# Patient Record
Sex: FEMALE | Race: WHITE | Employment: PART TIME | URBAN - METROPOLITAN AREA
[De-identification: names, ages, dates, MRNs, and addresses within clinical notes are randomized per-mention and may not be internally consistent; named-entity substitution may affect disease eponyms.]

---

## 2020-02-06 ENCOUNTER — HOSPITAL ENCOUNTER (OUTPATIENT)
Dept: MRI IMAGING | Age: 20
Discharge: HOME | End: 2020-02-06
Attending: ORTHOPAEDIC SURGERY
Payer: COMMERCIAL

## 2020-02-06 ENCOUNTER — HOSPITAL ENCOUNTER (OUTPATIENT)
Dept: GENERAL RADIOLOGY | Age: 20
Discharge: HOME | End: 2020-02-06
Attending: ORTHOPAEDIC SURGERY
Payer: COMMERCIAL

## 2020-02-06 DIAGNOSIS — M25.552 LEFT HIP PAIN: ICD-10-CM

## 2020-02-06 PROCEDURE — 74011250636 HC RX REV CODE- 250/636: Performed by: ORTHOPAEDIC SURGERY

## 2020-02-06 PROCEDURE — 77002 NEEDLE LOCALIZATION BY XRAY: CPT

## 2020-02-06 PROCEDURE — A9577 INJ MULTIHANCE: HCPCS | Performed by: ORTHOPAEDIC SURGERY

## 2020-02-06 PROCEDURE — 74011636320 HC RX REV CODE- 636/320: Performed by: ORTHOPAEDIC SURGERY

## 2020-02-06 PROCEDURE — 73722 MRI JOINT OF LWR EXTR W/DYE: CPT

## 2020-02-06 RX ORDER — LIDOCAINE HYDROCHLORIDE 10 MG/ML
1-10 INJECTION, SOLUTION EPIDURAL; INFILTRATION; INTRACAUDAL; PERINEURAL
Status: COMPLETED | OUTPATIENT
Start: 2020-02-06 | End: 2020-02-06

## 2020-02-06 RX ORDER — SODIUM CHLORIDE 9 MG/ML
1-20 INJECTION INTRAMUSCULAR; INTRAVENOUS; SUBCUTANEOUS
Status: COMPLETED | OUTPATIENT
Start: 2020-02-06 | End: 2020-02-06

## 2020-02-06 RX ORDER — LIDOCAINE HYDROCHLORIDE 10 MG/ML
INJECTION, SOLUTION EPIDURAL; INFILTRATION; INTRACAUDAL; PERINEURAL
Status: DISCONTINUED
Start: 2020-02-06 | End: 2020-02-06 | Stop reason: WASHOUT

## 2020-02-06 RX ADMIN — SODIUM CHLORIDE 20 ML: 9 INJECTION INTRAMUSCULAR; INTRAVENOUS; SUBCUTANEOUS at 13:29

## 2020-02-06 RX ADMIN — IOPAMIDOL 1 ML: 612 INJECTION, SOLUTION INTRAVENOUS at 13:31

## 2020-02-06 RX ADMIN — LIDOCAINE HYDROCHLORIDE 4 ML: 10 INJECTION, SOLUTION EPIDURAL; INFILTRATION; INTRACAUDAL; PERINEURAL at 13:29

## 2020-02-06 RX ADMIN — GADOBENATE DIMEGLUMINE 0.1 ML: 529 INJECTION, SOLUTION INTRAVENOUS at 13:26

## 2020-02-06 NOTE — H&P
The patient is an appropriate candidate to undergo hip arthrogram.    Patient assessed immediately prior to induction. Anesthesia plan as follows: Local/No Anesthesia. History and Physical update:  H&P was reviewed and the patient was examined. No changes have occurred in the patient's condition since the H&P was completed.     Jelena Garcia MD  Vascular & Interventional Radiology  [facility]  2/6/2020

## 2020-02-06 NOTE — PROCEDURES
Vascular & Interventional Radiology Brief Procedure Note    Interventional Radiologist: Lisa Rios MD    Pre-operative Diagnosis:  Hip pain    Post-operative Diagnosis: Same as pre-op dx    Procedure(s) Performed:  same    Anesthesia:  Local and Moderate Sedation    Findings:  Uncomplicated L hip arthrogram, MRI to follow.      Complications: None    Estimated Blood Loss:  minimal    Tubes and Drains: None    Specimens: None    Condition: Good       Lisa Rios MD  [facility]  Vascular & Interventional Radiology  2/6/2020

## 2020-05-20 ENCOUNTER — HOSPITAL ENCOUNTER (OUTPATIENT)
Dept: PHYSICAL THERAPY | Age: 20
Discharge: HOME | End: 2020-05-20
Payer: COMMERCIAL

## 2020-05-20 PROCEDURE — 97110 THERAPEUTIC EXERCISES: CPT | Performed by: PHYSICAL THERAPIST

## 2020-05-20 PROCEDURE — 97140 MANUAL THERAPY 1/> REGIONS: CPT | Performed by: PHYSICAL THERAPIST

## 2020-05-20 PROCEDURE — 97162 PT EVAL MOD COMPLEX 30 MIN: CPT | Performed by: PHYSICAL THERAPIST

## 2020-05-20 PROCEDURE — 97530 THERAPEUTIC ACTIVITIES: CPT | Performed by: PHYSICAL THERAPIST

## 2020-05-20 NOTE — PROGRESS NOTES
[address] AT [address], [address]  Phone: [phone]   [phone]  PLAN OF CARE / [address] [place] [address] PHYSICAL THERAPY SERVICES  Patient Name: Deepak Rivera : 2000   Medical   Diagnosis: Other sprain of left hip, initial encounter [S73.192A] Treatment Diagnosis: L hip pain   Onset Date: 2020     Referral Source: Marlene Abbott MD Start of [facility]): 2020   Prior Hospitalization: See medical history Provider #: [id number]   Prior Level of Function: IND   Comorbidities: Arthritis, alcohol use, R knee surgeries   Medications: Verified on Patient Summary List   The Plan of Care and following information is based on the information from the initial evaluation.   ===========================================================================================  Assessment / key information:  Pt is a 22year old female with L acetabular hip labral tear sp arthroscopic repair on 2020. Currently she rate her L hip pain a 5/10 with medication. Functional limitations are consistent with recent surgical interventions. She was a catcher for softball team in college and over the years her pain just increased. Negative for red flags. FOTO: 27/100. Upon evaluation, pt ambulates with brace on the L hip donned underneath clothing. She ambulated with B axillary crutches demonstrating decreased weight-bearing on the L with foot flat weight acceptance. Incision covered with bandages not to be removed for 3 days per patient. PROM of the L hip flexion: ~30 deg, abduction: ~25 deg, ER to neutral. Pt was able to demo a good quad contraction with quad set.  Pt would benefit from a course of skilled PT to address above deficits to return to PLOF symptom free.   ===========================================================================================  History MEDIUM  Complexity : 1-2 comorbidities / personal factors will impact the outcome/ POC ; Examination HIGH Complexity : 4+ Standardized tests and measures addressing body structure, function, activity limitation and / or participation in recreation ; Presentation MEDIUM Complexity : Evolving with changing characteristics ; Decision Making MEDIUM Complexity : FOTO score of 26-74; Complexity MEDIUM  Problem List: pain affecting function, decrease ROM, decrease strength, edema affecting function, impaired gait/ balance, decrease ADL/ functional abilitiies, decrease activity tolerance, decrease flexibility/ joint mobility and decrease transfer abilities   Treatment Plan may include any combination of the following: Therapeutic exercise, Therapeutic activities, Neuromuscular re-education, Physical agent/modality, Gait/balance training, Manual therapy, Patient education and Functional mobility training  Patient / Family readiness to learn indicated by: asking questions and trying to perform skills  Persons(s) to be included in education: family support person (FSP);list father  Barriers to Learning/Limitations: None  Measures taken:    Patient Goal (s): Return to ADLs pain free   Patient self reported health status: good  Rehabilitation Potential: good   Short Term Goals: To be accomplished in  2  weeks:  1. Pt will be IND and complaint with HEP for self-management of symptoms. 2. Pt will improve  PROM of the L hip to WNLs to prevent secondary complications.  Long Term Goals: To be accomplished in  4  weeks:  1. Pt will be able to ambulate community distances without AD demonstrating normalized gait pattern. 2. Pt will be able to perform full supine bridge without pain to progress core strengthening. 3. Pt will improve AROM of the L hip to full to maximize exercise tolerance. 4. Pt will improve FOTO score to at least 60/100 as a functional indicator improved mobility.    Frequency / Duration:   Patient to be seen  2  times per week for 4  weeks:  Patient / Caregiver education and instruction: exercises  G-Codes (GP): NA  Therapist Signature: Aleah Greenfield DPT Date: 8/22/0308   Certification Period: NA Time: 5:07 PM   ===========================================================================================  I certify that the above Physical Therapy Services are being furnished while the patient is under my care. I agree with the treatment plan and certify that this therapy is necessary. Physician Signature:        Date:       Time:     Please sign and return to [facility] or you may fax the signed copy to [phone]. Thank you.

## 2020-05-20 NOTE — PROGRESS NOTES
PT DAILY TREATMENT NOTE     Patient Name: Katlin Maurice  Date:2020  : 2000  [x]  Patient  Verified  Payor: Fayetta Hamman / Plan: Roxanne Talamantes / Product Type: HMO /    In time:407  Out time:500  Total Treatment Time (min): 53  Visit #: 1 of 8    Treatment Area: Other sprain of left hip, initial encounter [D44.930N]    SUBJECTIVE  Pain Level (0-10 scale): 5/10  Any medication changes, allergies to medications, adverse drug reactions, diagnosis change, or new procedure performed?: [x] No    [] Yes (see summary sheet for update)  Subjective functional status/changes:   [] No changes reported  Pt is a 22year old female with L acetabular hip labral tear sp arthroscopic repair on 2020. Currently she rate her L hip pain a 5/10 with medication. Functional limitations are consistent with recent surgical interventions. She was a catcher for softball team in college and over the years her pain just increased. Negative for red flags. FOTO: .       OBJECTIVE    Modality rationale: PD   Min Type Additional Details    [] Estim:  []Unatt       []IFC  []Premod                        []Other:  []w/ice   []w/heat  Position:  Location:    [] Estim: []Att    []TENS instruct  []NMES                    []Other:  []w/US   []w/ice   []w/heat  Position:  Location:    []  Traction: [] Cervical       []Lumbar                       [] Prone          []Supine                       []Intermittent   []Continuous Lbs:  [] before manual  [] after manual    []  Ultrasound: []Continuous   [] Pulsed                           []1MHz   []3MHz W/cm2:  Location:    []  Iontophoresis with dexamethasone         Location: [] Take home patch   [] In clinic    []  Ice     []  heat  []  Ice massage  []  Laser   []  Anodyne Position:  Location:    []  Laser with stim  []  Other:  Position:  Location:    []  Vasopneumatic Device Pressure:       [] lo [] med [] hi   Temperature: [] lo [] med [] hi   [] Skin assessment post-treatment:  []intact []redness- no adverse reaction    []redness - adverse reaction:     18 min [x]Eval                  []Re-Eval       10 min Therapeutic Exercise:  [] See flow sheet : established/educated PT HEP    Rationale: increase ROM and increase strength to improve the patients ability to return to PLOF    10 min Manual Therapy: Technique:      [] S/DTM []IASTM [x]PROM [] Passive Stretching   [] Graston:  [] GT 1  [] GT 2 [] GT 3 [] GT 4 [] GT 4 [] GT 5  [] GT 6  [] Sweep [] Fan [] [place]  [] Brush   []  Swivel []J- Stroke [] Scoop []IFraming     []Manual TPR  [] TDN (see objective; actual needle insertion time not billed)  []Jt manipulation:Gr I [] II []  III [] IV[] V[]  Treatment/Area: L hip per MD protocol    Rationale:      decrease pain, increase ROM, increase tissue extensibility and decrease trigger points to improve patient's ability to prevent secondary complications        With   [] TE   [x] TA 15 min   [] neuro   [] other: Patient Education: [x] Review HEP    [] Progressed/Changed HEP based on:   [] positioning   [] body mechanics   [] transfers   [] heat/ice application    [] Graston Education: Explained the effects and benefits of Graston Technique therapy including potential for post treatment soreness and bruising. [x] Other: how to properly sam/doff brace, fitted brace properly to patient     Other Objective/Functional Measures:   Upon evaluation, pt ambulates with brace on the L hip donned underneath clothing. She ambulated with B axillary crutches demonstrating decreased weight-bearing on the L with foot flat weight acceptance. Incision covered with bandages not to be removed for 3 days per patient. PROM of the L hip flexion: ~30 deg, abduction: ~25 deg, ER to neutral. Pt was able to demo a good quad contraction with quad set.      Pain Level (0-10 scale) post treatment: 5/10    ASSESSMENT/Changes in Function:   [x]  See Plan of Care  []  See progress note/recertification  []  See Discharge Summary         Progress towards goals / Updated goals:  PER POC    PLAN  []  Upgrade activities as tolerated     [x]  Continue plan of care  []  Update interventions per flow sheet       []  Discharge due to:_  [x]  Other 2x/week for 4 weeks     Justification for Eval Code Complexity:  Patient History : age, chronicty  Examination see exam   Clinical Presentation: evolving  Clinical Decision Making : FOTO : 32 /100        Scot Chilel, KATHYT 5/20/2020  4:16 PM    Future Appointments   Date Time Provider Michelle Luna   5/27/2020  4:00 PM Stephanie Parish, PT [facility] [address]   5/28/2020  1:00 PM Kamini New, PTA [facility] [address]   6/1/2020  3:00 PM Janet Yoni, PTA [facility] [address]   6/4/2020  4:00 PM Kamini New, PTA [facility] [address]   6/8/2020  4:00 PM Janet Yoni, PTA [facility] [address]   6/11/2020  4:00 PM Kamini New, PTA [facility] [address]   6/15/2020  4:00 PM Janet Yoni, PTA [facility] [address]   6/18/2020  4:00 PM Kamini New, PTA [facility] [address]   6/22/2020  4:00 PM Fouzia Maki, DPT [facility] [address]   6/25/2020  4:00 PM Jeanne Hamilton, PTA [facility] [address]

## 2020-05-27 ENCOUNTER — HOSPITAL ENCOUNTER (OUTPATIENT)
Dept: PHYSICAL THERAPY | Age: 20
Discharge: HOME | End: 2020-05-27
Payer: COMMERCIAL

## 2020-05-27 PROCEDURE — 97016 VASOPNEUMATIC DEVICE THERAPY: CPT

## 2020-05-27 PROCEDURE — 97110 THERAPEUTIC EXERCISES: CPT

## 2020-05-27 PROCEDURE — 97140 MANUAL THERAPY 1/> REGIONS: CPT

## 2020-05-27 NOTE — PROGRESS NOTES
PHYSICAL THERAPY - DAILY TREATMENT NOTE    Patient Name: Miriam Cabrales        Date: 2020  : 2000   yes Patient  Verified  Visit #:   2   of   8  Insurance: Payor: Shun Kay / Plan: Renetta Tan / Product Type: HMO /      In time: 4:05 Out time: 5:15   Total Treatment Time: 70     Medicare/[facility] Time Tracking (below)   Total Timed Codes (min):  60 1:1 Treatment Time:  52     TREATMENT AREA =  Other sprain of left hip, initial encounter [S73.012S]    SUBJECTIVE  Pain Level (on 0 to 10 scale):  0  / 10   Medication Changes/New allergies or changes in medical history, any new surgeries or procedures?    no  If yes, update Summary List   Subjective Functional Status/Changes:  []  No changes reported     Pt reports compliance w/ HEP.  Reports difficulty sleeping, has attempted to lay on her side but feels like \"gravity is working against me\"          OBJECTIVE  Modalities Rationale:     decrease inflammation and decrease pain to improve patient's ability to transfer   min [] Estim, type/location:                                      []  att     []  unatt     []  w/US     []  w/ice    []  w/heat    min []  Mechanical Traction: type/lbs                   []  pro   []  sup   []  int   []  cont    []  before manual    []  after manual    min []  Ultrasound, settings/location:      min []  Iontophoresis w/ dexamethasone, location:                                               []  take home patch       []  in clinic    min []  Ice     []  Heat    location/position:    10 min [x]  Vasopneumatic Device, press/temp: LP/LT to the L hip in spuine    min []  Other:    [x] Skin assessment post-treatment (if applicable):    [x]  intact    []  redness- no adverse reaction     []redness - adverse reaction:        50 min Therapeutic Exercise:  [x]  See flow sheet   Rationale:      increase ROM and increase strength to improve the patients ability to complete ADLs     10 min Manual Therapy: STM to the L TFL, iliopsoas, glute med, prox RF; supine hip flexion and abd PROM, prone knee flexion PROM, prone hip IR PROM, prone hip ER PROM to 15 deg   Rationale:      decrease pain, increase ROM, increase tissue extensibility and decrease trigger points to improve patient's ability to complete ADLs, transfers    Billed With/As:   [x] TE   [] TA   [] Neuro   [] Self Care Patient Education: [x] Review HEP    [] Progressed/Changed HEP based on:   [] positioning   [] body mechanics   [] transfers   [] heat/ice application    [] other:      Other Objective/Functional Measures:    Ambulating w/ 40% WB on the L  Added prone laying x 5' to reduce psoas tension  Able to ride upright bike with hip flexion < 90 deg to improve hip ROM  req A to don thigh strap on brace     Post Treatment Pain Level (on 0 to 10) scale:   0  / 10     ASSESSMENT  Assessment/Changes in Function:     Added prone laying 5' 3x/day to HEP for psoas flexibility. Reviewed post op restrictions and WB status. []  See Progress Note/Recertification   Patient will continue to benefit from skilled PT services to modify and progress therapeutic interventions, address functional mobility deficits, address ROM deficits, address strength deficits, analyze and address soft tissue restrictions, analyze and cue movement patterns, analyze and modify body mechanics/ergonomics, assess and modify postural abnormalities, address imbalance/dizziness and instruct in home and community integration to attain remaining goals. Progress toward goals / Updated goals:    Rehabilitation Potential: good  · Short Term Goals: To be accomplished in  2  weeks:  1. Pt will be IND and complaint with HEP for self-management of symptoms. 5/27/20: goal met, pt reports progress  2. Pt will improve  PROM of the L hip to WNLs to prevent secondary complications. · Long Term Goals: To be accomplished in  4  weeks:  1.  Pt will be able to ambulate community distances without AD demonstrating normalized gait pattern. 2. Pt will be able to perform full supine bridge without pain to progress core strengthening. 3. Pt will improve AROM of the L hip to full to maximize exercise tolerance. 4. Pt will improve FOTO score to at least 60/100 as a functional indicator improved mobility.         PLAN  []  Upgrade activities as tolerated yes Continue plan of care   []  Discharge due to :    []  Other:      Therapist: Jim Rousseau PT    Date: 5/27/2020 Time: 5:20 PM     Future Appointments   Date Time Provider Michelle Luna   5/28/2020  1:00 PM Heather Becker, PTA [facility]   6/1/2020  3:00 PM Andrea Claudio, PTA [facility]   6/4/2020  4:00 PM Heather Becker, PTA [facility]   6/8/2020  4:00 PM Harinder Estrada, PT [facility]   6/11/2020  4:00 PM Heather Becker, PTA [facility]   6/15/2020  4:00 PM Parvin Love, PT [facility]   6/18/2020  4:00 PM Heather Becker, PTA [facility]   6/22/2020  4:00 PM Harinder Estrada, PT [facility]   6/25/2020  4:00 PM Heather Becker, PTA [facility]

## 2020-05-28 ENCOUNTER — HOSPITAL ENCOUNTER (OUTPATIENT)
Dept: PHYSICAL THERAPY | Age: 20
Discharge: HOME | End: 2020-05-28
Payer: COMMERCIAL

## 2020-05-28 PROCEDURE — 97140 MANUAL THERAPY 1/> REGIONS: CPT

## 2020-05-28 PROCEDURE — 97110 THERAPEUTIC EXERCISES: CPT

## 2020-05-28 PROCEDURE — 97016 VASOPNEUMATIC DEVICE THERAPY: CPT

## 2020-05-28 NOTE — PROGRESS NOTES
PHYSICAL THERAPY - DAILY TREATMENT NOTE    Patient Name: Ninfa Reardon        Date: 2020  : 2000   yes Patient  Verified  Visit #:   3  of   8  Insurance: Payor: Viktoria Aguilar / Plan: Angel Daniel / Product Type: HMO /      In time: 1:00 Out time: 2:17   Total Treatment Time: 77     Medicare/[employer] Time Tracking (below)   Total Timed Codes (min):  57 1:1 Treatment Time:  52     TREATMENT AREA =  Other sprain of left hip, initial encounter [S73.192A]    SUBJECTIVE  Pain Level (on 0 to 10 scale):  0  / 10   Medication Changes/New allergies or changes in medical history, any new surgeries or procedures?    no  If yes, update Summary List   Subjective Functional Status/Changes:  []  No changes reported     I am doing pretty good, I have been doing my home exercise program - it is my favorite part of my day          OBJECTIVE  Modalities Rationale:     decrease inflammation and decrease pain to improve patient's ability to transfer   min [] Estim, type/location:                                      []  att     []  unatt     []  w/US     []  w/ice    []  w/heat    min []  Mechanical Traction: type/lbs                   []  pro   []  sup   []  int   []  cont    []  before manual    []  after manual    min []  Ultrasound, settings/location:      min []  Iontophoresis w/ dexamethasone, location:                                               []  take home patch       []  in clinic    min []  Ice     []  Heat    location/position:    15+5 set up min [x]  Vasopneumatic Device, press/temp: LP/LT to the L hip in spuine    min []  Other:    [x] Skin assessment post-treatment (if applicable):    [x]  intact    []  redness- no adverse reaction     []redness - adverse reaction:        47/42 min Therapeutic Exercise:  [x]  See flow sheet - 5 min NC for warm up  Increase from prone laying to BLAIR and prone press up to stretch the hip flexor   Rationale:      increase ROM and increase strength to improve the patients ability to complete ADLs     10 min Manual Therapy:  PROM in prone for Hip IR and ER, gentle quad stretch   PROM in supine for hip abduction   Rationale:      decrease pain, increase ROM, increase tissue extensibility and decrease trigger points to improve patient's ability to complete ADLs, transfers    Billed With/As:   [x] TE   [] TA   [] Neuro   [] Self Care Patient Education: [x] Review HEP    [] Progressed/Changed HEP based on:   [] positioning   [] body mechanics   [] transfers   [] heat/ice application    [] other:      Other Objective/Functional Measures:  Patient was able to advance to prone on elbow and gentle prone press up to gently stretch the hip flexor  Patient tolerated all exercises well within allowed protocol   Continue with above POC to achieve all goals stated below       Post Treatment Pain Level (on 0 to 10) scale:   0  / 10     ASSESSMENT  Assessment/Changes in Function:   Continue with above POC to achieve all goals stated below   []  See Progress Note/Recertification   Patient will continue to benefit from skilled PT services to modify and progress therapeutic interventions, address functional mobility deficits, address ROM deficits, address strength deficits, analyze and address soft tissue restrictions, analyze and cue movement patterns, analyze and modify body mechanics/ergonomics, assess and modify postural abnormalities, address imbalance/dizziness and instruct in home and community integration to attain remaining goals. Progress toward goals / Updated goals:    Rehabilitation Potential: good  · Short Term Goals: To be accomplished in  2  weeks:  1. Pt will be IND and complaint with HEP for self-management of symptoms. 5/27/20: goal met, pt reports progress  2. Pt will improve  PROM of the L hip to WNLs to prevent secondary complications. · Long Term Goals: To be accomplished in  4  weeks:  1.  Pt will be able to ambulate community distances without AD demonstrating normalized gait pattern. 2. Pt will be able to perform full supine bridge without pain to progress core strengthening. 3. Pt will improve AROM of the L hip to full to maximize exercise tolerance. 4. Pt will improve FOTO score to at least 60/100 as a functional indicator improved mobility.         PLAN  []  Upgrade activities as tolerated yes Continue plan of care   []  Discharge due to :    []  Other:      Therapist: Shaun Mendez PTA    Date: 5/28/2020 Time: 5:20 PM     Future Appointments   Date Time Provider Michelle Luna   6/1/2020  3:00 PM Carey Rios, PTA [facility]   6/4/2020  4:00 PM Jj Hernandez, PTA [facility]   6/8/2020  4:00 PM Agustín Loev, PT [facility]   6/11/2020  4:00 PM Jj Hernandez, PTA [facility]   6/15/2020  4:00 PM Agustín Love, PT [facility]   6/18/2020  4:00 PM Jj Hernandez, PTA [facility]   6/22/2020  4:00 PM Sharona Lu, PT [facility]   6/25/2020  4:00 PM Jj Hernandez, PTA [facility]

## 2020-06-01 ENCOUNTER — HOSPITAL ENCOUNTER (OUTPATIENT)
Dept: PHYSICAL THERAPY | Age: 20
Discharge: HOME | End: 2020-06-01
Payer: COMMERCIAL

## 2020-06-01 PROCEDURE — 97016 VASOPNEUMATIC DEVICE THERAPY: CPT

## 2020-06-01 PROCEDURE — 97140 MANUAL THERAPY 1/> REGIONS: CPT

## 2020-06-01 PROCEDURE — 97110 THERAPEUTIC EXERCISES: CPT

## 2020-06-01 NOTE — PROGRESS NOTES
PHYSICAL THERAPY - DAILY TREATMENT NOTE    Patient Name: Radha Prasad        Date: 2020  : 2000   yes Patient  Verified  Visit #:   4   of   8  Insurance: Payor: Bobbi Deras / Plan: Keven Ratliff / Product Type: HMO /      In time: 3:00 Out time: 4:13   Total Treatment Time: 73     Medicare/[facility] Time Tracking (below)   Total Timed Codes (min):  58 1:1 Treatment Time:  43     TREATMENT AREA =  Other sprain of left hip, initial encounter [S73.192A]    SUBJECTIVE  Pain Level (on 0 to 10 scale):  0  / 10   Medication Changes/New allergies or changes in medical history, any new surgeries or procedures?    no  If yes, update Summary List   Subjective Functional Status/Changes:  []  No changes reported     \"I barely use my crutches at home, but I don't think I put full weight through my leg.  My gait is still wonky\"          OBJECTIVE  Modalities Rationale:     decrease edema, decrease inflammation and decrease pain to improve patient's ability to ambulate   min [] Estim, type/location:                                      []  att     []  unatt     []  w/US     []  w/ice    []  w/heat    min []  Mechanical Traction: type/lbs                   []  pro   []  sup   []  int   []  cont    []  before manual    []  after manual    min []  Ultrasound, settings/location:      min []  Iontophoresis w/ dexamethasone, location:                                               []  take home patch       []  in clinic    min []  Ice     []  Heat    location/position:    15 min [x]  Vasopneumatic Device, press/temp: LP/LT to the L hip in supine    min []  Other:    [x] Skin assessment post-treatment (if applicable):    [x]  intact    []  redness- no adverse reaction     []redness  adverse reaction:        45 min Therapeutic Exercise:  [x]  See flow sheet   Rationale:      increase ROM and increase strength to improve the patients ability to ambulate, transfer     10 min Manual Therapy: Prone hip IR/ER, gentle PQS, supine hip abd, flex PROM, supine 90/90 HS str   Rationale:      decrease pain, increase ROM, increase tissue extensibility and decrease trigger points to improve patient's ability to ambulate    Billed With/As:   [x] TE   [] TA   [] Neuro   [] Self Care Patient Education: [x] Review HEP    [] Progressed/Changed HEP based on:   [] positioning   [] body mechanics   [] transfers   [] heat/ice application    [] other:      Other Objective/Functional Measures:    amb with B axillary crutch w/ min support through crutches; significant R weight shift but improving stride length  Added SL hip abd/add isotonics and prone hip ext to improve hip strength/stability  Added prone planks for core stability     Post Treatment Pain Level (on 0 to 10) scale:   0  / 10     ASSESSMENT  Assessment/Changes in Function:     MD f/u June 4; plan to progress standing WB ex and gait training next week pending MD schwartz      []  See Progress Note/Recertification   Patient will continue to benefit from skilled PT services to modify and progress therapeutic interventions, address functional mobility deficits, address ROM deficits, address strength deficits, analyze and address soft tissue restrictions, analyze and cue movement patterns, analyze and modify body mechanics/ergonomics, address imbalance/dizziness and instruct in home and community integration to attain remaining goals. Progress toward goals / Updated goals: · Short Term Goals: To be accomplished in  2  weeks:  1. Pt will be IND and complaint with HEP for self-management of symptoms. 5/27/20: goal met, pt reports progress  2. Pt will improve  PROM of the L hip to WNLs to prevent secondary complications. · Long Term Goals: To be accomplished in  4  weeks:  1. Pt will be able to ambulate community distances without AD demonstrating normalized gait pattern. 6/2/20: goal in progress, demo improving symmetry of gait/step length w/ min use of B axillary crutch; continued R weight shift  2.  Pt will be able to perform full supine bridge without pain to progress core strengthening. 3. Pt will improve AROM of the L hip to full to maximize exercise tolerance.   4. Pt will improve FOTO score to at least 60/100 as a functional indicator improved mobility.        PLAN  []  Upgrade activities as tolerated yes Continue plan of care   []  Discharge due to :    []  Other:      Therapist: Steffen Castro PT    Date: 6/1/2020 Time: 5:01 PM     Future Appointments   Date Time Provider Michelle Luna   6/3/2020  9:00 AM Luis E Centeno, PTA [facility]   6/8/2020  4:00 PM Yaya Treviño, PT [facility]   6/11/2020  4:00 PM Garth Gao, PTA [facility]   6/15/2020  4:00 PM Dallas Love, PT [facility]   6/18/2020  4:00 PM Garth Gao, PTA [facility]   6/22/2020  3:00 PM Carlos Dodge, DPT [facility]   6/25/2020  4:00 PM Marce Hill, PTA [facility]

## 2020-06-03 ENCOUNTER — HOSPITAL ENCOUNTER (OUTPATIENT)
Dept: PHYSICAL THERAPY | Age: 20
Discharge: HOME | End: 2020-06-03
Payer: COMMERCIAL

## 2020-06-03 PROCEDURE — 97110 THERAPEUTIC EXERCISES: CPT

## 2020-06-03 PROCEDURE — 97140 MANUAL THERAPY 1/> REGIONS: CPT

## 2020-06-03 PROCEDURE — 97016 VASOPNEUMATIC DEVICE THERAPY: CPT

## 2020-06-03 NOTE — PROGRESS NOTES
PHYSICAL THERAPY - DAILY TREATMENT NOTE    Patient Name: Radha Prasad        Date: 6/3/2020  : 2000   yes Patient  Verified  Visit #:   5   of   8  Insurance: Payor: Bobbi Deras / Plan: Keven Ratliff / Product Type: HMO /      In time: 9:00 Out time: 10:20   Total Treatment Time: 80     Medicare/[facility] Time Tracking (below)   Total Timed Codes (min):  60 1:1 Treatment Time:  50     TREATMENT AREA =  Other sprain of left hip, initial encounter [S73.192A]    SUBJECTIVE  Pain Level (on 0 to 10 scale):  0  / 10   Medication Changes/New allergies or changes in medical history, any new surgeries or procedures?    no  If yes, update Summary List   Subjective Functional Status/Changes:  []  No changes reported     I am ready to get rid of these crutches, I see the doctor tomorrow so hopefully I can get rid of the brace and the crutches           OBJECTIVE  Modalities Rationale:     decrease edema, decrease inflammation and decrease pain to improve patient's ability to ambulate   min [] Estim, type/location:                                      []  att     []  unatt     []  w/US     []  w/ice    []  w/heat    min []  Mechanical Traction: type/lbs                   []  pro   []  sup   []  int   []  cont    []  before manual    []  after manual    min []  Ultrasound, settings/location:      min []  Iontophoresis w/ dexamethasone, location:                                               []  take home patch       []  in clinic    min []  Ice     []  Heat    location/position:    15+5 set up min [x]  Vasopneumatic Device, press/temp: LP/LT to the L hip in supine    min []  Other:    [x] Skin assessment post-treatment (if applicable):    [x]  intact    []  redness- no adverse reaction     []redness  adverse reaction:        50/40 min Therapeutic Exercise:  [x]  See flow sheet - 10 min NC for warm up           Added side lying mod planks for 20 sec each side 2 reps each    Rationale:      increase ROM and increase strength to improve the patients ability to ambulate, transfer     10 min Manual Therapy: Prone hip IR/ER, gentle PQS, supine hip abd, flex PROM, supine 90/90 HS str   Rationale:      decrease pain, increase ROM, increase tissue extensibility and decrease trigger points to improve patient's ability to ambulate    Billed With/As:   [x] TE   [] TA   [] Neuro   [] Self Care Patient Education: [x] Review HEP    [] Progressed/Changed HEP based on:   [] positioning   [] body mechanics   [] transfers   [] heat/ice application    [] other:      Other Objective/Functional Measures:  Per Protocol - patient is 3 weeks post-op was able to  Advance within protocol   Added standing hip hiking in parallel bars, side stepping and backwards stepping with one hand on counter top, modified side planks to both sides   Decreased to one crutch        Post Treatment Pain Level (on 0 to 10) scale:   0  / 10     ASSESSMENT  Assessment/Changes in Function:          []  See Progress Note/Recertification   Patient will continue to benefit from skilled PT services to modify and progress therapeutic interventions, address functional mobility deficits, address ROM deficits, address strength deficits, analyze and address soft tissue restrictions, analyze and cue movement patterns, analyze and modify body mechanics/ergonomics, address imbalance/dizziness and instruct in home and community integration to attain remaining goals. Progress toward goals / Updated goals: · Short Term Goals: To be accomplished in  2  weeks:  1. Pt will be IND and complaint with HEP for self-management of symptoms. 5/27/20: goal met, pt reports progress  2. Pt will improve  PROM of the L hip to WNLs to prevent secondary complications. · Long Term Goals: To be accomplished in  4  weeks:  1. Pt will be able to ambulate community distances without AD demonstrating normalized gait pattern.  6/2/20: goal in progress, demo improving symmetry of gait/step length w/ min use of B axillary crutch; continued R weight shift  2. Pt will be able to perform full supine bridge without pain to progress core strengthening. 3. Pt will improve AROM of the L hip to full to maximize exercise tolerance.   4. Pt will improve FOTO score to at least 60/100 as a functional indicator improved mobility.        PLAN  []  Upgrade activities as tolerated yes Continue plan of care   []  Discharge due to :    []  Other:      Therapist: Beverly Grullon PTA    Date: 6/3/2020 Time: 5:01 PM     Future Appointments   Date Time Provider Michelle Luna   6/8/2020  4:00 PM Renzo De La Rosa, PT [facility]   6/11/2020  4:00 PM Artie Oviedo PTA [facility]   6/15/2020  4:00 PM Cuong Love, PT [facility]   6/18/2020  4:00 PM Artie Oviedo PTA [facility]   6/22/2020  3:00 PM KATHY AquinoT [facility]   6/25/2020  4:00 PM Brittney Gay, TERRY [facility]

## 2020-06-04 ENCOUNTER — APPOINTMENT (OUTPATIENT)
Dept: PHYSICAL THERAPY | Age: 20
End: 2020-06-04
Payer: COMMERCIAL

## 2020-06-08 ENCOUNTER — HOSPITAL ENCOUNTER (OUTPATIENT)
Dept: PHYSICAL THERAPY | Age: 20
Discharge: HOME | End: 2020-06-08
Payer: COMMERCIAL

## 2020-06-08 PROCEDURE — 97110 THERAPEUTIC EXERCISES: CPT

## 2020-06-08 PROCEDURE — 97016 VASOPNEUMATIC DEVICE THERAPY: CPT

## 2020-06-08 PROCEDURE — 97140 MANUAL THERAPY 1/> REGIONS: CPT

## 2020-06-08 NOTE — PROGRESS NOTES
PHYSICAL THERAPY - DAILY TREATMENT NOTE    Patient Name: Maria A Whitley        Date: 2020  : 2000   yes Patient  Verified  Visit #:   6   of   8  Insurance: Payor: Davion Found / Plan: Susan Cooler / Product Type: HMO /      In time: 4:00 Out time: 5:08   Total Treatment Time: 68     Medicare/[facility] Time Tracking (below)   Total Timed Codes (min):  53 1:1 Treatment Time:  53     TREATMENT AREA =  Other sprain of left hip, initial encounter [S73.042A]    SUBJECTIVE  Pain Level (on 0 to 10 scale):  0  / 10   Medication Changes/New allergies or changes in medical history, any new surgeries or procedures?    no  If yes, update Summary List   Subjective Functional Status/Changes:  []  No changes reported     Pt reports MD was pleased w/ her progress, cleared to amb w/o AD, d/c brace, and drive. Reports she drove 5 hours to [place] this past weekend and had no pain or stiffness in her hip. OBJECTIVE  Modalities Rationale:     decrease inflammation and decrease pain to improve patient's ability to ambulate   min [] Estim, type/location:                                      []  att     []  unatt     []  w/US     []  w/ice    []  w/heat    min []  Mechanical Traction: type/lbs                   []  pro   []  sup   []  int   []  cont    []  before manual    []  after manual    min []  Ultrasound, settings/location:      min []  Iontophoresis w/ dexamethasone, location:                                               []  take home patch       []  in clinic    min []  Ice     []  Heat    location/position:    15 min [x]  Vasopneumatic Device, press/temp:  To the L hip, MP/LT in supine    min []  Other:    [x] Skin assessment post-treatment (if applicable):    [x]  intact    []  redness- no adverse reaction     []redness  adverse reaction:        45 min Therapeutic Exercise:  [x]  See flow sheet   Rationale:      increase ROM, increase strength, improve coordination, improve balance and increase proprioception to improve the patients ability to ambulate, transfer     8 min Manual Therapy: Prone hip IR/ER, gentle PQS, supine hip abd, flex PROM, supine 90/90 HS str   Rationale:      decrease pain, increase ROM, increase tissue extensibility and decrease trigger points to improve patient's ability to complete ADLs      Billed With/As:   [x] TE   [] TA   [] Neuro   [] Self Care Patient Education: [x] Review HEP    [] Progressed/Changed HEP based on:   [] positioning   [] body mechanics   [] transfers   [] heat/ice application    [] other:      Other Objective/Functional Measures:    Presents ambulating w/o AD, mildly reduced L hip extension otherwise near symmetrical gait  Progressed WB ex per flow to improve stabilization and strength to the L hip  Added SLS on airex to improve LE proprioception  Visible fatigue noted to isometric hip flexion, no pain reported to L hip flexor       Post Treatment Pain Level (on 0 to 10) scale:   0  / 10     ASSESSMENT  Assessment/Changes in Function:     Pt progressing excellent per post op protocol at 3 weeks post-op tomorrow. Will continue to progress as tolerated within protocol guidelines to improve function      []  See Progress Note/Recertification   Patient will continue to benefit from skilled PT services to modify and progress therapeutic interventions, address functional mobility deficits, address ROM deficits, address strength deficits, analyze and address soft tissue restrictions, analyze and cue movement patterns, analyze and modify body mechanics/ergonomics, assess and modify postural abnormalities, address imbalance/dizziness and instruct in home and community integration to attain remaining goals. Progress toward goals / Updated goals: · Short Term Goals: To be accomplished in  2  weeks:  1. Pt will be IND and complaint with HEP for self-management of symptoms. 5/27/20: goal met, pt reports progress  2.  Pt will improve  PROM of the L hip to WNLs to prevent secondary complications. · Long Term Goals: To be accomplished in  4  weeks:  1. Pt will be able to ambulate community distances without AD demonstrating normalized gait pattern. 6/2/20: goal in progress, demo improving symmetry of gait/step length w/ min use of B axillary crutch; continued R weight shift  2. Pt will be able to perform full supine bridge without pain to progress core strengthening. 6/8/20: goal met, supine bridge to 100%. Progress towards SL at [place]  3. Pt will improve AROM of the L hip to full to maximize exercise tolerance. 4. Pt will improve FOTO score to at least 60/100 as a functional indicator improved mobility.        PLAN  []  Upgrade activities as tolerated yes Continue plan of care   []  Discharge due to :    []  Other:      Therapist: Erskine Sandifer, PT    Date: 6/8/2020 Time: 4:18 PM     Future Appointments   Date Time Provider Michelle Luna   6/11/2020  4:00 PM Jennifer Davis PTA [facility]   6/15/2020  4:00 PM David Suárez PT [facility]   6/18/2020  4:00 PM Jennifer Davis PTA [facility]   6/22/2020  3:00 PM KATHY DietrichT [facility]   6/25/2020  4:00 PM Chai Phillips, PTA [facility]

## 2020-06-11 ENCOUNTER — HOSPITAL ENCOUNTER (OUTPATIENT)
Dept: PHYSICAL THERAPY | Age: 20
Discharge: HOME | End: 2020-06-11
Payer: COMMERCIAL

## 2020-06-11 PROCEDURE — 97530 THERAPEUTIC ACTIVITIES: CPT

## 2020-06-11 PROCEDURE — 97110 THERAPEUTIC EXERCISES: CPT

## 2020-06-11 NOTE — PROGRESS NOTES
PHYSICAL THERAPY - DAILY TREATMENT NOTE    Patient Name: Chetan Garner        Date: 2020  : 2000   yes Patient  Verified  Visit #:   7  of   8  Insurance: Payor: Petra  / Plan: [employer] / Product Type: HMO /      In time: 4:05 Out time: 4:58   Total Treatment Time:      Medicare/[employer] Time Tracking (below)   Total Timed Codes (min):  53 1:1 Treatment Time:  48     TREATMENT AREA =  Other sprain of left hip, initial encounter [S73.518S]    SUBJECTIVE  Pain Level (on 0 to 10 scale):  0  / 10   Medication Changes/New allergies or changes in medical history, any new surgeries or procedures?    no  If yes, update Summary List   Subjective Functional Status/Changes:  []  No changes reported     I am doing great no brace, no crutches and no pain        OBJECTIVE  Modalities Rationale:     decrease inflammation and decrease pain to improve patient's ability to ambulate   min [] Estim, type/location:                                      []  att     []  unatt     []  w/US     []  w/ice    []  w/heat    min []  Mechanical Traction: type/lbs                   []  pro   []  sup   []  int   []  cont    []  before manual    []  after manual    min []  Ultrasound, settings/location:      min []  Iontophoresis w/ dexamethasone, location:                                               []  take home patch       []  in clinic    min []  Ice     []  Heat    location/position:     min []  Vasopneumatic Device, press/temp:  To the L hip, MP/LT in supine    min []  Other:    [x] Skin assessment post-treatment (if applicable):    [x]  intact    []  redness- no adverse reaction     []redness  adverse reaction:        38/33 min Therapeutic Exercise:  [x]  See flow sheet - 5 min NC for warm up   Added 2 way clams with t-band  Standing hip adductor stretch and hamstring and hip flexor stretch  Added prone resisted hip IR and ER  Increase planks to 30 sec    Rationale:      increase ROM, increase strength, improve coordination, improve balance and increase proprioception to improve the patients ability to ambulate, transfer       15 min Therapeutic Activity:  [x]  See flow sheet : added SLS on foam with ball toss into trampoline   Added hip hiking off 6\" step with active hip abduction  t-band circuit with green t-band and side stepping with green t-band:  Single leg stance on fitter with star taps      Rationale: increase ROM, increase strength, improve coordination, improve balance and increase proprioception  to improve the patients ability to ambulate and return to normal ADLs        Billed With/As:   [x] TE   [] TA   [] Neuro   [] Self Care Patient Education: [x] Review HEP    [] Progressed/Changed HEP based on:   [] positioning   [] body mechanics   [] transfers   [] heat/ice application    [] other:      Other Objective/Functional Measures: see above for update exercises and activities to increase challenge of program for  Patient to allow patient to return to normal ADL and ambulation  Instructed patient in self stretching and gave update HEP for home use      Post Treatment Pain Level (on 0 to 10) scale:   0  / 10     ASSESSMENT  Assessment/Changes in Function:   Patient tolerated all exercises and activities well with no increase in pain or discomfort was challenged with new routine - ambulates with no gait deviation noted only slight muscle weakness and decrease stability with SLS on fitter with star taps to quads      []  See Progress Note/Recertification   Patient will continue to benefit from skilled PT services to modify and progress therapeutic interventions, address functional mobility deficits, address ROM deficits, address strength deficits, analyze and address soft tissue restrictions, analyze and cue movement patterns, analyze and modify body mechanics/ergonomics, assess and modify postural abnormalities, address imbalance/dizziness and instruct in home and community integration to attain remaining goals.   Progress toward goals / Updated goals: · Short Term Goals: To be accomplished in  2  weeks:  1. Pt will be IND and complaint with HEP for self-management of symptoms. 5/27/20: goal met, pt reports progress  2. Pt will improve  PROM of the L hip to WNLs to prevent secondary complications. · Long Term Goals: To be accomplished in  4  weeks:  1. Pt will be able to ambulate community distances without AD demonstrating normalized gait pattern. 6/2/20: goal in progress, demo improving symmetry of gait/step length w/ min use of B axillary crutch; continued R weight shift  2. Pt will be able to perform full supine bridge without pain to progress core strengthening. 6/8/20: goal met, supine bridge to 100%. Progress towards SL at [place]  3. Pt will improve AROM of the L hip to full to maximize exercise tolerance. 4. Pt will improve FOTO score to at least 60/100 as a functional indicator improved mobility.        PLAN  []  Upgrade activities as tolerated yes Continue plan of care   []  Discharge due to :    []  Other:      Therapist: Bree Sosa PTA    Date: 6/11/2020 Time: 4:18 PM     Future Appointments   Date Time Provider Michelle Luna   6/15/2020  4:00 PM Tip Love, PT [facility]   6/18/2020  4:00 PM Lauren Kaye, PTA [facility]   6/22/2020  3:00 PM KATHY CadeT [facility]   6/25/2020  4:00 PM Nikolai Dickinson, PTA [facility]

## 2020-06-15 ENCOUNTER — APPOINTMENT (OUTPATIENT)
Dept: PHYSICAL THERAPY | Age: 20
End: 2020-06-15
Payer: COMMERCIAL

## 2020-06-18 ENCOUNTER — HOSPITAL ENCOUNTER (OUTPATIENT)
Dept: PHYSICAL THERAPY | Age: 20
Discharge: HOME | End: 2020-06-18
Payer: COMMERCIAL

## 2020-06-18 PROCEDURE — 97110 THERAPEUTIC EXERCISES: CPT

## 2020-06-18 NOTE — PROGRESS NOTES
PHYSICAL THERAPY - DAILY TREATMENT NOTE    Patient Name: Bubba Carmen        Date: 2020  : 2000   yes Patient  Verified  Visit #:   8  of   8  Insurance: Payor: Grecia Adames / Plan: Eva Cook / Product Type: HMO /      In time: 4:00 Out time: 5:00   Total Treatment Time: 60     Medicare/BCBS Time Tracking (below)   Total Timed Codes (min):  60 1:1 Treatment Time:  50     TREATMENT AREA =  Other sprain of left hip, initial encounter [S73.192A]    SUBJECTIVE  Pain Level (on 0 to 10 scale):  0  / 10   Medication Changes/New allergies or changes in medical history, any new surgeries or procedures?    no  If yes, update Summary List   Subjective Functional Status/Changes:  []  No changes reported     I would say overall I am 100% better since starting therapy but I still want to be able to run and I know that I can't hop either. I also can't really squat either or bring my knee as high as I can with my right knee towards my chest      OBJECTIVE  Modalities Rationale:     decrease inflammation and decrease pain to improve patient's ability to ambulate   min [] Estim, type/location:                                      []  att     []  unatt     []  w/US     []  w/ice    []  w/heat    min []  Mechanical Traction: type/lbs                   []  pro   []  sup   []  int   []  cont    []  before manual    []  after manual    min []  Ultrasound, settings/location:      min []  Iontophoresis w/ dexamethasone, location:                                               []  take home patch       []  in clinic    min []  Ice     []  Heat    location/position:     min []  Vasopneumatic Device, press/temp:  To the L hip, MP/LT in supine    min []  Other:    [x] Skin assessment post-treatment (if applicable):    [x]  intact    []  redness- no adverse reaction     []redness  adverse reaction:        30/20 min Therapeutic Exercise:  [x]  See flow sheet - 10 min NC for warm up and self stretching    Changed bike to lateral X - forward and backwards for 6 min total      Rationale:      increase ROM, increase strength, improve coordination, improve balance and increase proprioception to improve the patients ability to ambulate, transfer       15 min Therapeutic Activity:  [x]  See flow sheet :     Rationale: increase ROM, increase strength, improve coordination, improve balance and increase proprioception  to improve the patients ability to ambulate and return to normal ADLs        15 Billed With/As:   [x] TE   [] TA   [] Neuro   [] Self Care Patient Education: [x] Review HEP    [] Progressed/Changed HEP based on:   [] positioning   [] body mechanics   [] transfers   [] heat/ice application    [x] other: REASSESSMENT AND FOTO      Other Objective/Functional Measures:  FOTO score is 60 was 27/100 on evaluation - goal  AROM in supine - hip flexion (L) 120 and (R) 115 degrees                                  Knee flexion (L) 140 and (R) 130                                 Hip abduction (L) 40 and (R) 35                                90/90 HS flexibility is (L) -8 and (R) -45  Standing active squat - patient is able to achieve 70 degrees knee flexion  MMT hip flexion 3-/5 and hip abduction and extension 3+/5   Post Treatment Pain Level (on 0 to 10) scale:   0  / 10     ASSESSMENT  Assessment/Changes in Function:   Please refer to progress report        [x]  See Progress Note/Recertification   Patient will continue to benefit from skilled PT services to modify and progress therapeutic interventions, address functional mobility deficits, address ROM deficits, address strength deficits, analyze and address soft tissue restrictions, analyze and cue movement patterns, analyze and modify body mechanics/ergonomics, assess and modify postural abnormalities, address imbalance/dizziness and instruct in home and community integration to attain remaining goals. Progress toward goals / Updated goals: · Short Term Goals:  To be accomplished in  2  weeks:  1. Pt will be IND and complaint with HEP for self-management of symptoms. 5/27/20: goal met, pt reports progress  2. Pt will improve  PROM of the L hip to WNLs to prevent secondary complications. MET 6/18/2020  · Long Term Goals: To be accomplished in  4  weeks:  1. Pt will be able to ambulate community distances without AD demonstrating normalized gait pattern. Met 6/18/2020  2. Pt will be able to perform full supine bridge without pain to progress core strengthening. 6/8/20: goal met, supine bridge to 100%. Progress towards SL at NV - MET 6/18/2020  3. Pt will improve AROM of the L hip to full to maximize exercise tolerance. MET 6/18/2020  4. Pt will improve FOTO score to at least 60/100 as a functional indicator improved mobility. MET 6/18/2020     PLAN  []  Upgrade activities as tolerated yes Continue plan of care   []  Discharge due to :    [x]  Other: PLEASE REFER TO PROGRESS REPORT      Therapist: Beto Reza PTA    Date: 6/18/2020 Time: 4:18 PM     Future Appointments   Date Time Provider Michelle Luna   6/22/2020  3:00 PM [facility] [address]   6/25/2020  4:00 PM [facility] Hill [facility] [address]

## 2020-06-18 NOTE — PROGRESS NOTES
[facility] PHYSICAL THERAPY  [address], [facility], [address] - Phone: [phone]  Fax: [phone]  PROGRESS NOTE  Patient Name: Kindra Borja : 2000   Treatment/Medical Diagnosis: Other sprain of left hip, initial encounter [S73.192A]   Referral Source: Jase Garcia MD     Date of Initial Visit: 2020 Attended Visits: 8 Missed Visits: 0     SUMMARY OF TREATMENT  Patient has received physical therapy for L acetabular hip labral tear sp arthroscopic repair on 2020  since . Treatment has included therapeutic stretching, strengthen, activities, manual/massage and modalities as need to assist with decreasing pain and increasing function. CURRENT STATUS  Patient has completed  8  visits  Patient reports overall 100% improvement since beginning therapy  FOTO (Functional Status Summary)  score is 60/100 was 27/100 initial evaluation - indication of overall functional improvement. AROM in supine - hip flexion (L) 120 and (R) 115 degrees                                  Knee flexion (L) 140 and (R) 130                                 Hip abduction (L) 40 and (R) 35                                90/90 HS flexibility is (L) -8 and (R) -45  Standing active squat - patient is able to achieve 70 degrees knee flexion  MMT hip flexion 3-/5 and hip abduction and extension 3+/5     Patient's remaining chief c/o is\" I would say overall I am 100% better since starting therapy but I still want to be able to run and I know that I can't hop either. I also can't really squat either or bring my knee as high as I can with my right knee towards my chest\"     Progress toward goals / Updated goals:     · Short Term Goals: To be accomplished in  2  weeks:  1. Pt will be IND and complaint with HEP for self-management of symptoms. 20: goal met, pt reports progress  2. Pt will improve  PROM of the L hip to WNLs to prevent secondary complications. MET 2020  · Long Term Goals: To be accomplished in  4  weeks:  1. Pt will be able to ambulate community distances without AD demonstrating normalized gait pattern. Met 6/18/2020  2. Pt will be able to perform full supine bridge without pain to progress core strengthening. 6/8/20: goal met, supine bridge to 100%. Progress towards SL at NV - MET 6/18/2020  3. Pt will improve AROM of the L hip to full to maximize exercise tolerance. MET 6/18/2020  4. Pt will improve FOTO score to at least 60/100 as a functional indicator improved mobility. MET 6/18/2020      New Goals to be achieved in 4 weeks:  Long-term Goals: 4 weeks  1. Patient will be able to perform 2X10 body weight squats to 90 degrees of knee flexion (B)ly      Progress Note (6/18/2020):patient is able to perform 1 squat to 70 degrees of knee flexion       Current:     2. Improve (L) hip strength to 5/5 MMT to improve hip/pelvic stability with ambulation/stairs      Progress Note (6/18/2020): hip flexion 3-/5 and hip abduction and extension 3+/5      Current:     RECOMMENDATIONS  Continue with above POC for 2 to 3 times a week for 4 weeks to achieve above goals      If you have any questions/comments please contact us directly at [phone]. Thank you for allowing us to assist in the care of your patient. Therapist Signature: Nanda Dotson PTA Date: 6/18/2020     Time: 4:06 PM   NOTE TO PHYSICIAN:  PLEASE COMPLETE THE ORDERS BELOW AND FAX TO   [facility] Physical Therapy: [phone]  If you are unable to process this request in 24 hours please contact our office: [phone]    ___ I have read the above report and request that my patient continue as recommended.   ___ I have read the above report and request that my patient continue therapy with the following changes/special instructions:_________________________________________________________   ___ I have read the above report and request that my patient be discharged from therapy.      Physician Signature: Date:       Time:

## 2020-06-22 ENCOUNTER — HOSPITAL ENCOUNTER (OUTPATIENT)
Dept: PHYSICAL THERAPY | Age: 20
Discharge: HOME | End: 2020-06-22
Payer: COMMERCIAL

## 2020-06-22 PROCEDURE — 97110 THERAPEUTIC EXERCISES: CPT

## 2020-06-22 PROCEDURE — 97530 THERAPEUTIC ACTIVITIES: CPT

## 2020-06-22 NOTE — PROGRESS NOTES
PHYSICAL THERAPY - DAILY TREATMENT NOTE    Patient Name: Lilly Frances        Date: 2020  : 2000   yes Patient  Verified  Visit #:     Insurance: Payor: Lisbeth Valdes / Plan: Mode Cunningham / Product Type: HMO /      In time: 2:56 Out time: 3:55   Total Treatment Time: 59     Medicare/BCBS Time Tracking (below)   Total Timed Codes (min):  59 1:1 Treatment Time:  49     TREATMENT AREA =  Other sprain of left hip, initial encounter [S73.333A]    SUBJECTIVE  Pain Level (on 0 to 10 scale):  0  / 10   Medication Changes/New allergies or changes in medical history, any new surgeries or procedures?    no  If yes, update Summary List   Subjective Functional Status/Changes:  []  No changes reported   I am doing good today, I worked all day and I also moved this weekend so it has been busy       OBJECTIVE  Modalities Rationale:     decrease inflammation and decrease pain to improve patient's ability to ambulate   min [] Estim, type/location:                                      []  att     []  unatt     []  w/US     []  w/ice    []  w/heat    min []  Mechanical Traction: type/lbs                   []  pro   []  sup   []  int   []  cont    []  before manual    []  after manual    min []  Ultrasound, settings/location:      min []  Iontophoresis w/ dexamethasone, location:                                               []  take home patch       []  in clinic    min []  Ice     []  Heat    location/position:     min []  Vasopneumatic Device, press/temp:     min []  Other:    [x] Skin assessment post-treatment (if applicable):    [x]  intact    []  redness- no adverse reaction     []redness  adverse reaction:        44/34 min Therapeutic Exercise:  [x]  See flow sheet - 10 min warm up on lateral X  Added squats with 5 sec hold on BOSU with bubble side down  Added SL squats on TG - highest level     Rationale:      increase ROM, increase strength, improve coordination, improve balance and increase proprioception to improve the patients ability to ambulate, transfer       15 min Therapeutic Activity:  [x]  See flow sheet :     Rationale: increase ROM, increase strength, improve coordination, improve balance and increase proprioception  to improve the patients ability to ambulate and return to normal ADLs         Billed With/As:   [] TE   [] TA   [] Neuro   [] Self Care Patient Education: [x] Review HEP    [] Progressed/Changed HEP based on:   [] positioning   [] body mechanics   [] transfers   [] heat/ice application    [] other:      Other Objective/Functional Measures: Added additional exercise to increase strength to allow patient to perform squat to achieve new goal - LTG#1 - stated below   Improved stability seen with SLS on foam with ball toss into tramp    Post Treatment Pain Level (on 0 to 10) scale:   0  / 10     ASSESSMENT  Assessment/Changes in Function:          [x]  See Progress Note/Recertification   Patient will continue to benefit from skilled PT services to modify and progress therapeutic interventions, address functional mobility deficits, address ROM deficits, address strength deficits, analyze and address soft tissue restrictions, analyze and cue movement patterns, analyze and modify body mechanics/ergonomics, assess and modify postural abnormalities, address imbalance/dizziness and instruct in home and community integration to attain remaining goals. Progress toward goals / Updated goals:  New Goals to be achieved in 4 weeks:  Long-term Goals: 4 weeks  1. Patient will be able to perform 2X10 body weight squats to 90 degrees of knee flexion (B)ly      Progress Note (6/18/2020):patient is able to perform 1 squat to 70 degrees of knee flexion       Current:     2.  Improve (L) hip strength to 5/5 MMT to improve hip/pelvic stability with ambulation/stairs      Progress Note (6/18/2020): hip flexion 3-/5 and hip abduction and extension 3+/5      Current:       PLAN  []  Upgrade activities as tolerated yes Continue plan of care   []  Discharge due to :    []  Other:      Therapist: Niya Adorno PTA    Date: 6/22/2020 Time: 4:18 PM     Future Appointments   Date Time Provider Michelle Luna   6/25/2020  4:00 PM Dru Jara PTA [facility]

## 2020-06-25 ENCOUNTER — HOSPITAL ENCOUNTER (OUTPATIENT)
Dept: PHYSICAL THERAPY | Age: 20
Discharge: HOME | End: 2020-06-25
Payer: COMMERCIAL

## 2020-06-25 PROCEDURE — 97110 THERAPEUTIC EXERCISES: CPT

## 2020-06-25 NOTE — PROGRESS NOTES
PHYSICAL THERAPY - DAILY TREATMENT NOTE    Patient Name: Fiorella Echavarria        Date: 2020  : 2000   yes Patient  Verified  Visit #:   2  of   8  Insurance: Payor: Earnest Brown / Plan: Lena Levy / Product Type: HMO /      In time: 4:00 Out time: 5:00   Total Treatment Time:      Medicare/[facility] Time Tracking (below)   Total Timed Codes (min):  60 1:1 Treatment Time:  55     TREATMENT AREA =  Other sprain of left hip, initial encounter [S73.407A]    SUBJECTIVE  Pain Level (on 0 to 10 scale):  0  / 10   Medication Changes/New allergies or changes in medical history, any new surgeries or procedures?    no  If yes, update Summary List   Subjective Functional Status/Changes:  []  No changes reported   I am doing good, worked all day today but I have been practicing my standing on one leg to get my balance better and to get better at that one exercise we do        OBJECTIVE  Modalities Rationale:     decrease inflammation and decrease pain to improve patient's ability to ambulate   min [] Estim, type/location:                                      []  att     []  unatt     []  w/US     []  w/ice    []  w/heat    min []  Mechanical Traction: type/lbs                   []  pro   []  sup   []  int   []  cont    []  before manual    []  after manual    min []  Ultrasound, settings/location:      min []  Iontophoresis w/ dexamethasone, location:                                               []  take home patch       []  in clinic    min []  Ice     []  Heat    location/position:     min []  Vasopneumatic Device, press/temp:     min []  Other:    [x] Skin assessment post-treatment (if applicable):    [x]  intact    []  redness- no adverse reaction     []redness  adverse reaction:        60/55 min Therapeutic Exercise:  [x]  See flow sheet - 5 min warm up on lateral X  Added walking lunges and stationary lunges   Added squats with #15 dumb bells   Added supine glut bridge with red swiss ball and with 8# med ball overhead  Added supine hamstring curls with red swiss ball  Added dead lifts with bar bell - 45#  Added carrying #25 kettle bell in one hand and lifitng at shoulder height #10 kettle bell      Rationale:      increase ROM, increase strength, improve coordination, improve balance and increase proprioception to improve the patients ability to ambulate, transfer        min Therapeutic Activity:  [x]  See flow sheet :     Rationale: increase ROM, increase strength, improve coordination, improve balance and increase proprioception  to improve the patients ability to ambulate and return to normal ADLs         Billed With/As:   [] TE   [] TA   [] Neuro   [] Self Care Patient Education: [x] Review HEP    [] Progressed/Changed HEP based on:   [] positioning   [] body mechanics   [] transfers   [] heat/ice application    [] other:      Other Objective/Functional Measures:  See above for new exercises given - update program to increase challenge of program    Post Treatment Pain Level (on 0 to 10) scale:   0  / 10     ASSESSMENT  Assessment/Changes in Function:   Patient tolerated all new exercises well expect for  lunges - patient presents with increase fear to perform a lunge with the (R) knee in the back and (L) knee in the front due to prior damage and surgeries that were performed to the (R) knee - was able to perform stationary lunge with holding onto the bar         [x]  See Progress Note/Recertification   Patient will continue to benefit from skilled PT services to modify and progress therapeutic interventions, address functional mobility deficits, address ROM deficits, address strength deficits, analyze and address soft tissue restrictions, analyze and cue movement patterns, analyze and modify body mechanics/ergonomics, assess and modify postural abnormalities, address imbalance/dizziness and instruct in home and community integration to attain remaining goals.    Progress toward goals / Updated goals:  New Goals to be achieved in 4 weeks:  Long-term Goals: 4 weeks  1. Patient will be able to perform 2X10 body weight squats to 90 degrees of knee flexion (B)ly      Progress Note (6/18/2020):patient is able to perform 1 squat to 70 degrees of knee flexion       Current:     2.  Improve (L) hip strength to 5/5 MMT to improve hip/pelvic stability with ambulation/stairs      Progress Note (6/18/2020): hip flexion 3-/5 and hip abduction and extension 3+/5      Current:       PLAN  []  Upgrade activities as tolerated yes Continue plan of care   []  Discharge due to :    []  Other:      Therapist: Ray Cordova PTA    Date: 6/25/2020 Time: 4:18 PM     Future Appointments   Date Time Provider Michelle Luna   6/30/2020  6:00 PM Colette Herbert, PTA [facility]   7/1/2020  5:00 PM Huan Clifton, PTA [address] [facility]   7/6/2020  7:45 AM Yenni Love, PT [facility]   7/8/2020  8:00 AM Yenni Love, PT [facility]   7/13/2020  7:00 AM Marialuisa LUCAS, PT [facility]   7/15/2020  8:00 AM Yenni Love, PT [facility]   7/20/2020  7:45 AM Yenni Love, PT [facility]   7/22/2020  5:00 PM Fahad Griffin, PT [facility]   7/27/2020  6:00 PM Josphine Sharona, PT [facility]   7/29/2020  5:15 PM Josphine Sharona, PT [facility]

## 2020-06-30 ENCOUNTER — HOSPITAL ENCOUNTER (OUTPATIENT)
Dept: PHYSICAL THERAPY | Age: 20
Discharge: HOME | End: 2020-06-30
Payer: COMMERCIAL

## 2020-06-30 PROCEDURE — 97110 THERAPEUTIC EXERCISES: CPT

## 2020-06-30 NOTE — PROGRESS NOTES
PHYSICAL THERAPY - DAILY TREATMENT NOTE    Patient Name: Ras Ospina        Date: 2020  : 2000   yes Patient  Verified  Visit #:   3  of   8  Insurance: Payor: Rowan Radford / Plan: Shantal Scott / Product Type: HMO /      In time: 5:55 Out time: 7:00   Total Treatment Time: 65     Medicare/[facility] Time Tracking (below)   Total Timed Codes (min):65 1:1 Treatment Time:  50     TREATMENT AREA =  Other sprain of left hip, initial encounter [S73.865H]    SUBJECTIVE  Pain Level (on 0 to 10 scale):  0  / 10   Medication Changes/New allergies or changes in medical history, any new surgeries or procedures?    no  If yes, update Summary List   Subjective Functional Status/Changes:  []  No changes reported   I am doing great      OBJECTIVE  Modalities Rationale:     decrease inflammation and decrease pain to improve patient's ability to ambulate   min [] Estim, type/location:                                      []  att     []  unatt     []  w/US     []  w/ice    []  w/heat    min []  Mechanical Traction: type/lbs                   []  pro   []  sup   []  int   []  cont    []  before manual    []  after manual    min []  Ultrasound, settings/location:      min []  Iontophoresis w/ dexamethasone, location:                                               []  take home patch       []  in clinic    min []  Ice     []  Heat    location/position:     min []  Vasopneumatic Device, press/temp:     min []  Other:    [x] Skin assessment post-treatment (if applicable):    [x]  intact    []  redness- no adverse reaction     []redness  adverse reaction:        65/50 min Therapeutic Exercise:  [x]  See flow sheet - 15 min warm up on lateral X and stretching         Rationale:      increase ROM, increase strength, improve coordination, improve balance and increase proprioception to improve the patients ability to ambulate, transfer        min Therapeutic Activity:  [x]  See flow sheet :     Rationale: increase ROM, increase strength, improve coordination, improve balance and increase proprioception  to improve the patients ability to ambulate and return to normal ADLs       Billed With/As:   [] TE   [] TA   [] Neuro   [] Self Care Patient Education: [x] Review HEP    [] Progressed/Changed HEP based on:   [] positioning   [] body mechanics   [] transfers   [] heat/ice application    [] other:      Other Objective/Functional Measures:  Continue with increasing strength and progress within advanced exercise program     Post Treatment Pain Level (on 0 to 10) scale:   0  / 10     ASSESSMENT  Assessment/Changes in Function:   Patient was able to perform hip hiking off 4\" step with hip abduction with improved range and strength and less reporting of discomfort during exercise - patient was also able to perform stationary lunges to both side without HHA with improved knee flexion note to the (R) leg   [x]  See Progress Note/Recertification   Patient will continue to benefit from skilled PT services to modify and progress therapeutic interventions, address functional mobility deficits, address ROM deficits, address strength deficits, analyze and address soft tissue restrictions, analyze and cue movement patterns, analyze and modify body mechanics/ergonomics, assess and modify postural abnormalities, address imbalance/dizziness and instruct in home and community integration to attain remaining goals. Progress toward goals / Updated goals:  New Goals to be achieved in 4 weeks:  Long-term Goals: 4 weeks  1. Patient will be able to perform 2X10 body weight squats to 90 degrees of knee flexion (B)ly      Progress Note (6/18/2020):patient is able to perform 1 squat to 70 degrees of knee flexion       Current:     2.  Improve (L) hip strength to 5/5 MMT to improve hip/pelvic stability with ambulation/stairs      Progress Note (6/18/2020): hip flexion 3-/5 and hip abduction and extension 3+/5      Current: progressing patient was able to perform SLR in supine 3+/5 6/30/2020       PLAN  []  Upgrade activities as tolerated yes Continue plan of care   []  Discharge due to :    []  Other:      Therapist: Sancho Dougherty PTA    Date: 6/30/2020 Time: 4:18 PM     Future Appointments   Date Time Provider Michelle Luna   7/1/2020  5:00 PM Alta Ramos [address]   7/6/2020  7:45 AM Ivan, Maxcine Kyra, PT [facility] [address]   7/8/2020  8:00 AM Ivan, Maxcine Kyra, PT [facility] [address]   7/13/2020  7:00 AM Ivan, Maxcine Kyra, PT [facility] [address]   7/15/2020  8:00 AM Ivan, Maxcine Kyra, PT [facility] [address]   7/20/2020  7:45 AM Ivan, Maxcine Kyra, PT [facility] [address]   7/22/2020  5:00 PM Norva Holstein., PT [facility] [address]   7/27/2020  6:00 PM Racheal House, PT [facility] [address]   7/29/2020  5:15 PM Racheal House, PT [facility] [address]

## 2020-07-01 ENCOUNTER — APPOINTMENT (OUTPATIENT)
Dept: PHYSICAL THERAPY | Age: 20
End: 2020-07-01
Payer: COMMERCIAL

## 2020-07-06 ENCOUNTER — HOSPITAL ENCOUNTER (OUTPATIENT)
Dept: PHYSICAL THERAPY | Age: 20
Discharge: HOME | End: 2020-07-06
Payer: COMMERCIAL

## 2020-07-06 PROCEDURE — 97110 THERAPEUTIC EXERCISES: CPT

## 2020-07-06 NOTE — PROGRESS NOTES
PHYSICAL THERAPY - DAILY TREATMENT NOTE    Patient Name: Reynaldo Balbuena        Date: 2020  : 2000   yes Patient  Verified  Visit #:   4   of   8  Insurance: Payor: Muna Kerr / Plan: Victorino Louis / Product Type: HMO /      In time: 7:45 Out time: 8:38   Total Treatment Time: 53     Medicare/[employer] Time Tracking (below)   Total Timed Codes (min):  53 1:1 Treatment Time:  53     TREATMENT AREA =  Other sprain of left hip, initial encounter [S73.192A]    SUBJECTIVE  Pain Level (on 0 to 10 scale):  0  / 10   Medication Changes/New allergies or changes in medical history, any new surgeries or procedures?    no  If yes, update Summary List   Subjective Functional Status/Changes:  []  No changes reported     \"The only thing I really feel limited in is running\"          OBJECTIVE    53 min Therapeutic Exercise:  [x]  See flow sheet   Rationale:      increase strength, improve coordination, improve balance and increase proprioception to improve the patients ability to return to sport     Billed With/As:   [x] TE   [] TA   [] Neuro   [] Self Care Patient Education: [x] Review HEP    [] Progressed/Changed HEP based on:   [] positioning   [] body mechanics   [] transfers   [] heat/ice application    [] other:      Other Objective/Functional Measures:    Able to advance to conventional deadlift, goblet sumo squats, and RFE SS this visit  Demo significant hip hinge in goblet squat and RFE SS       Post Treatment Pain Level (on 0 to 10) scale:   0  / 10     ASSESSMENT  Assessment/Changes in Function:     Pt making excellent progress for 7 weeks post op, well ahead of typical presentation. Discussed avoiding running until cleared by surgeon.      []  See Progress Note/Recertification   Patient will continue to benefit from skilled PT services to modify and progress therapeutic interventions, address functional mobility deficits, address ROM deficits, address strength deficits, analyze and address soft tissue restrictions, analyze and cue movement patterns, analyze and modify body mechanics/ergonomics, assess and modify postural abnormalities, address imbalance/dizziness and instruct in home and community integration to attain remaining goals.    Progress toward goals / Updated goals:    New Goals to be achieved in 4 weeks:  Long-term Goals: 4 weeks  1. Patient will be able to perform 2X10 body weight squats to 90 degrees of knee flexion (B)ly      Progress Note (6/18/2020):patient is able to perform 1 squat to 70 degrees of knee flexion       Current:  7/6/20: goal in progress, squat to parallel with excessive hip hinge  2. Improve (L) hip strength to 5/5 MMT to improve hip/pelvic stability with ambulation/stairs      Progress Note (6/18/2020): hip flexion 3-/5 and hip abduction and extension 3+/5      Current: progressing patient was able to perform SLR in supine 3+/5 6/30/2020     PLAN  []  Upgrade activities as tolerated yes Continue plan of care   []  Discharge due to :    []  Other:      Therapist: Cullen Ferreira PT    Date: 7/6/2020 Time: 7:53 AM     Future Appointments   Date Time Provider Michelle Luna   7/8/2020  8:00 AM China Love, PT [facility]   7/13/2020  7:00 AM China Love, PT [facility]   7/15/2020  8:00 AM China Love, PT [facility]   7/20/2020  7:45 AM China Love, PT [facility]   7/22/2020  5:00 PM Yulisa Garcia, PT [facility]   7/27/2020  6:00 PM Patsy Cantor, PT [facility]   7/29/2020  5:15 PM Patsy Cantor, PT [facility]

## 2020-07-08 ENCOUNTER — APPOINTMENT (OUTPATIENT)
Dept: PHYSICAL THERAPY | Age: 20
End: 2020-07-08
Payer: COMMERCIAL

## 2020-07-13 ENCOUNTER — APPOINTMENT (OUTPATIENT)
Dept: PHYSICAL THERAPY | Age: 20
End: 2020-07-13
Payer: COMMERCIAL

## 2020-07-15 ENCOUNTER — HOSPITAL ENCOUNTER (OUTPATIENT)
Dept: PHYSICAL THERAPY | Age: 20
Discharge: HOME | End: 2020-07-15
Payer: COMMERCIAL

## 2020-07-15 PROCEDURE — 97110 THERAPEUTIC EXERCISES: CPT

## 2020-07-15 NOTE — PROGRESS NOTES
PHYSICAL THERAPY - DAILY TREATMENT NOTE    Patient Name: Jenny Dale        Date: 7/15/2020  : 2000   yes Patient  Verified  Visit #:   5   of   8  Insurance: Payor: Roger Arriaza / Plan: Warner Mcdonnell / Product Type: HMO /      In time: 8:03 Out time: 8:30   Total Treatment Time: 27     Medicare/[facility] Time Tracking (below)   Total Timed Codes (min):  27 1:1 Treatment Time:  27     TREATMENT AREA =  Other sprain of left hip, initial encounter [S73.192A]    SUBJECTIVE  Pain Level (on 0 to 10 scale):  0  / 10   Medication Changes/New allergies or changes in medical history, any new surgeries or procedures?    no  If yes, update Summary List   Subjective Functional Status/Changes:  []  No changes reported     Pt reports 75-80% overall improvement towards PLOF.    Improvements: range of motion and strength  Functional limitations: running, cutting/changing directions, deeper ranges of squatting, kayla-cross applesauce sitting       Pt request to be out of PT by 8:30 2' work       OBJECTIVE    27 min Therapeutic Exercise:  [x]  See flow sheet   Rationale:      increase ROM and increase strength to improve the patients ability to return to PLOF     Billed With/As:   [x] TE   [] TA   [] Neuro   [] Self Care Patient Education: [x] Review HEP    [] Progressed/Changed HEP based on:   [] positioning   [] body mechanics   [] transfers   [] heat/ice application    [] other:      Other Objective/Functional Measures:    L hip AROM WNL all planes, pain-free  MMT: hip flex 4-/5, abd 4+/5, ext 4+/5, add 4/5  Squat to 85 deg  FOTO 70     Post Treatment Pain Level (on 0 to 10) scale:   0  / 10     ASSESSMENT  Assessment/Changes in Function:     See PN     []  See Progress Note/Recertification   Patient will continue to benefit from skilled PT services to modify and progress therapeutic interventions, address functional mobility deficits, address ROM deficits, address strength deficits, analyze and address soft tissue restrictions, analyze and cue movement patterns, analyze and modify body mechanics/ergonomics, assess and modify postural abnormalities and instruct in home and community integration to attain remaining goals.    Progress toward goals / Updated goals:    See PN     PLAN  []  Upgrade activities as tolerated yes Continue plan of care   []  Discharge due to :    []  Other:      Therapist: Adalberto Castellon PT    Date: 7/15/2020 Time: 8:05 AM     Future Appointments   Date Time Provider Michelle Luna   7/20/2020  7:45 AM Moriah Kirk, PT [facility]   7/22/2020  5:00 PM Edith Anand, PT [facility]   7/27/2020  6:00 PM Moriah Kirk, PT [facility]   7/29/2020  5:15 PM Moriah Kirk, PT [facility]

## 2020-07-15 NOTE — PROGRESS NOTES
[facility] PHYSICAL THERAPY  [address], [place], [address] - Phone: [phone]  Fax: [phone]  PROGRESS NOTE  Patient Name: Jazmine Dunbar : 2000   Treatment/Medical Diagnosis: Other sprain of left hip, initial encounter [S73.192A]   Referral Source: Betito Álvarez MD     Date of Initial Visit: 2020 Attended Visits: 13 Missed Visits: 0     SUMMARY OF TREATMENT  Pt has attended 13 sessions of PT for the tx of L hip s/p L acetabular labral repair (DOS 2020). PT tx has consisted of therex to improve L hip ROM, flexibility, and strength, manual tx for tissue extensibility and joint mobility, modalities prn and HEP. CURRENT STATUS  Pt making excellent progress towards goals thus far, reporting 75-80% return to PLOF. Current improvements include range of motion and strength. Remaining functional limitations include running, cutting/changing directions, deep ranges of squatting, and sitting cross legged on floor. Reports no pain in the last month. L hip AROM WNL in all planes, pain-free  MMT hip flex 4-/5, abd 4+/5, ext 4+/5, add 4/5  Squat to 85 deg of knee flexion w/ mild R weight shift at bottom  FOTO 70/100    Goal/Measure of Progress Goal Met? 1. Pt will be able to perform 2x10 body weight squats to 90 degrees of knee flexion B   Status at last Eval: Squat to 70 deg Current Status: Squat to 85 deg w/ mild R weight shift at bottom progress   2. Improve L hip strength to 5/5 MMT to improve hip/pelvic stability w/ ambulation and stairs   Status at last Eval: Hip flex 3-/5, abd and ext 3+/5 Current Status: Hip flex 4-/5, abd 4+/5, add 4/5, ext 4+/5 progress     New Goals to be achieved in __4__  weeks:  1. Pt will back squat 135# for 8-10 reps w/ symmetrical WB to return to PLOF  2.  Initiate running/plyometrics pending MD approval in order to return towards PLOF    RECOMMENDATIONS  Recommend pt continue PT 2x/wk for 4 weeks to further progress advanced strengthening and initiate plyo/running program as cleared by surgeon. Thank you for this referral.    If you have any questions/comments please contact us directly at [phone]. Thank you for allowing us to assist in the care of your patient. Therapist Signature: Tamiko Oneal PT Date: 7/15/2020     Time: 8:20 AM   NOTE TO PHYSICIAN:  PLEASE COMPLETE THE ORDERS BELOW AND FAX TO   [facility]: [phone]  If you are unable to process this request in 24 hours please contact our office: [phone]    ___ I have read the above report and request that my patient continue as recommended.   ___ I have read the above report and request that my patient continue therapy with the following changes/special instructions:_________________________________________________________   ___ I have read the above report and request that my patient be discharged from therapy.      Physician Signature:        Date:       Time:

## 2020-07-20 ENCOUNTER — HOSPITAL ENCOUNTER (OUTPATIENT)
Dept: PHYSICAL THERAPY | Age: 20
Discharge: HOME | End: 2020-07-20
Payer: COMMERCIAL

## 2020-07-20 PROCEDURE — 97110 THERAPEUTIC EXERCISES: CPT

## 2020-07-20 NOTE — PROGRESS NOTES
PHYSICAL THERAPY - DAILY TREATMENT NOTE    Patient Name: Heather Graves        Date: 2020  : 2000   yes Patient  Verified  Visit #:      8  Insurance: Payor: Opal Lomas / Plan: Relda Plaster / Product Type: HMO /      In time: 7:45 Out time: 8:50   Total Treatment Time: 65     Medicare/[facility] Time Tracking (below)   Total Timed Codes (min):  55 1:1 Treatment Time:  46     TREATMENT AREA =  Other sprain of left hip, initial encounter [S73.192A]    SUBJECTIVE  Pain Level (on 0 to 10 scale):  1  / 10   Medication Changes/New allergies or changes in medical history, any new surgeries or procedures?    no  If yes, update Summary List   Subjective Functional Status/Changes:  []  No changes reported     \"The doctor said I can't run until 3 months. \" Reports she has been having some hip flexor pain that began yesterday insidiously.  Denies doing anything to irritate it over the last week, noting she has not been in the gym and has not swam/gone to beach, etc.             OBJECTIVE  Modalities Rationale:     decrease inflammation and decrease pain to improve patient's ability to ambulate   min [] Estim, type/location:                                      []  att     []  unatt     []  w/US     []  w/ice    []  w/heat    min []  Mechanical Traction: type/lbs                   []  pro   []  sup   []  int   []  cont    []  before manual    []  after manual    min []  Ultrasound, settings/location:      min []  Iontophoresis w/ dexamethasone, location:                                               []  take home patch       []  in clinic   10 min []  Ice     []  Heat    location/position:     min []  Vasopneumatic Device, press/temp:     min []  Other:    [x] Skin assessment post-treatment (if applicable):    [x]  intact    []  redness- no adverse reaction     []redness  adverse reaction:        55 min Therapeutic Exercise:  [x]  See flow sheet   Rationale:      increase ROM and increase strength to improve the patients ability to return to PLOF      Billed With/As:   [x] TE   [] TA   [] Neuro   [] Self Care Patient Education: [x] Review HEP    [] Progressed/Changed HEP based on:   [] positioning   [] body mechanics   [] transfers   [] heat/ice application    [] other:      Other Objective/Functional Measures:    Inc L hip flexor pain w/ hip extension and isometric hip flexion  Modified program today to avoid irritation to L hip flexor  Added 1/2 kneel gentle hip flexor str     Post Treatment Pain Level (on 0 to 10) scale:   0  / 10     ASSESSMENT  Assessment/Changes in Function:     Sx suggestive of L hip flexor tendonitis. Pt advised to refrain from repetitive hip flexion over the next week, continue to ice, and gentle stretches to hip flexor. []  See Progress Note/Recertification   Patient will continue to benefit from skilled PT services to modify and progress therapeutic interventions, address functional mobility deficits, address ROM deficits, address strength deficits, analyze and address soft tissue restrictions, analyze and cue movement patterns, analyze and modify body mechanics/ergonomics, assess and modify postural abnormalities and instruct in home and community integration to attain remaining goals. Progress toward goals / Updated goals:    1. Pt will back squat 135# for 8-10 reps w/ symmetrical WB to return to PLOF  2.  Initiate running/plyometrics pending MD approval in order to return towards PLOF       PLAN  []  Upgrade activities as tolerated yes Continue plan of care   []  Discharge due to :    []  Other:      Therapist: Noble Desai PT    Date: 7/20/2020 Time: 8:26 AM     Future Appointments   Date Time Provider Michelle Luna   7/22/2020  5:00 PM Anoop Chávez, PT [facility]   7/27/2020  6:00 PM ZAEN Rojas [facility]   7/29/2020  5:15 PM Carlene Holland PT [facility]

## 2020-07-22 ENCOUNTER — HOSPITAL ENCOUNTER (OUTPATIENT)
Dept: PHYSICAL THERAPY | Age: 20
Discharge: HOME | End: 2020-07-22
Payer: COMMERCIAL

## 2020-07-22 PROCEDURE — 97110 THERAPEUTIC EXERCISES: CPT

## 2020-07-22 NOTE — PROGRESS NOTES
PHYSICAL THERAPY - DAILY TREATMENT NOTE    Patient Name: Reynaldo Balbuena        Date: 2020  : 2000   yes Patient  Verified  Visit #:   7   of   8  Insurance: Payor: Muna Kerr / Plan: Victorino Louis / Product Type: HMO /      In time: 4:55 PM Out time: 5:35 PM   Total Treatment Time: 40     Medicare/BCBS Time Tracking (below)   Total Timed Codes (min):  35 1:1 Treatment Time:  25     TREATMENT AREA =  Other sprain of left hip, initial encounter [S73.789A]    SUBJECTIVE  Pain Level (on 0 to 10 scale):  0  / 10   Medication Changes/New allergies or changes in medical history, any new surgeries or procedures?    no  If yes, update Summary List   Subjective Functional Status/Changes:  []  No changes reported   Pt states she has increased fatigue to LE's today because she did a leg day at the gym yesterday.        OBJECTIVE  Modalities Rationale:     decrease inflammation and decrease pain to improve patient's ability to ambulate   min [] Estim, type/location:                                      []  att     []  unatt     []  w/US     []  w/ice    []  w/heat    min []  Mechanical Traction: type/lbs                   []  pro   []  sup   []  int   []  cont    []  before manual    []  after manual    min []  Ultrasound, settings/location:      min []  Iontophoresis w/ dexamethasone, location:                                               []  take home patch       []  in clinic   PD min []  Ice     []  Heat    location/position:     min []  Vasopneumatic Device, press/temp:     min []  Other:    [x] Skin assessment post-treatment (if applicable):    [x]  intact    []  redness- no adverse reaction     []redness  adverse reaction:        25  (35) min Therapeutic Exercise:  [x]  See flow sheet (-5 min warm up)   Rationale:      increase ROM and increase strength to improve the patients ability to return to PLOF      Billed With/As:   [x] TE   [] TA   [] Neuro   [] Self Care Patient Education: [x] Review HEP [] Progressed/Changed HEP based on:   [] positioning   [] body mechanics   [] transfers   [] heat/ice application    [] other:      Other Objective/Functional Measures:    -able to resume all normal exercises without L hip flexor pain c/o     Post Treatment Pain Level (on 0 to 10) scale:   0  / 10     ASSESSMENT  Assessment/Changes in Function:     Pt without hip flexor irritation noted today and able to perform all exercises without increased pain c/o. Pt cued occasionally for form to avoid genu valgum and to improve eccentric loading with lateral lunge onto LLE. Continue to advance within MD protocol. []  See Progress Note/Recertification   Patient will continue to benefit from skilled PT services to modify and progress therapeutic interventions, address functional mobility deficits, address ROM deficits, address strength deficits, analyze and address soft tissue restrictions, analyze and cue movement patterns, analyze and modify body mechanics/ergonomics, assess and modify postural abnormalities and instruct in home and community integration to attain remaining goals. Progress toward goals / Updated goals:    1. Pt will back squat 135# for 8-10 reps w/ symmetrical WB to return to PLOF  2. Initiate running/plyometrics pending MD approval in order to return towards PLOF. -7/22: progressing within MD protocol; no plyometric/running activity allowed yet       PLAN  []  Upgrade activities as tolerated yes Continue plan of care   []  Discharge due to :    []  Other:      Therapist: Jean-Pierre Hendrickson.  Dafne Kimball, PT    Date: 7/22/2020 Time: 5:44 PM      Future Appointments   Date Time Provider Michelle Luna   7/22/2020  5:00 PM Anoop Chávez, PT [facility]   7/27/2020  6:00 PM Carlene Holland, PT [facility]   7/29/2020  5:15 PM Senthil Love, PT [facility]   8/4/2020  7:45 AM [facility] PT CHILLED POND 2 [facility]   8/6/2020  7:45 AM Bolton Organ, PTA [facility]   8/11/2020  7:45 AM Bolton Organ, PTA [facility]   8/13/2020  7:45 AM Blanche Marshall, PTA [facility]   8/18/2020  7:45 AM Blanche Marshall, PTA [facility]   8/20/2020  7:45 AM McKissick, Ruby Ganser, PT [facility]   8/25/2020  7:45 AM Blanche Marshall, PTA [facility]   8/27/2020  7:45 AM McKissick, Ruby Ganser, PT [facility]

## 2020-07-27 ENCOUNTER — HOSPITAL ENCOUNTER (OUTPATIENT)
Dept: PHYSICAL THERAPY | Age: 20
Discharge: HOME | End: 2020-07-27
Payer: COMMERCIAL

## 2020-07-27 PROCEDURE — 97110 THERAPEUTIC EXERCISES: CPT

## 2020-07-27 PROCEDURE — 97140 MANUAL THERAPY 1/> REGIONS: CPT

## 2020-07-27 NOTE — PROGRESS NOTES
PHYSICAL THERAPY - DAILY TREATMENT NOTE    Patient Name: Jazmine Dunbar        Date: 2020  : 2000   yes Patient  Verified  Visit #:   3   of   8  Insurance: Payor: Mitchell Crespo / Plan: Thea Wright / Product Type: HMO /      In time: 4:40 Out time: 5:43   Total Treatment Time: 63     Medicare/[facility] Time Tracking (below)   Total Timed Codes (min):  53 1:1 Treatment Time:  53     TREATMENT AREA =  Other sprain of left hip, initial encounter [S73.192A]    SUBJECTIVE  Pain Level (on 0 to 10 scale):  0  / 10   Medication Changes/New allergies or changes in medical history, any new surgeries or procedures?    no  If yes, update Summary List   Subjective Functional Status/Changes:  []  No changes reported     Pt reports inc hip flexor pain, up to 4/10, this morning insidiously. Reports she had to move equipment at work on Friday but had no pain during or afterwards. Reports she had to take Aleve today. OBJECTIVE  Modalities Rationale:     decrease inflammation and decrease pain to improve patient's ability to ambulate   min [] Estim, type/location:                                      []  att     []  unatt     []  w/US     []  w/ice    []  w/heat    min []  Mechanical Traction: type/lbs                   []  pro   []  sup   []  int   []  cont    []  before manual    []  after manual    min []  Ultrasound, settings/location:      min []  Iontophoresis w/ dexamethasone, location:                                               []  take home patch       []  in clinic   10 min [x]  Ice     []  Heat    location/position:  To the L hip in supine    min []  Vasopneumatic Device, press/temp:     min []  Other:    [x] Skin assessment post-treatment (if applicable):    [x]  intact    []  redness- no adverse reaction     []redness  adverse reaction:        41 min Therapeutic Exercise:  [x]  See flow sheet   Rationale:      increase ROM, increase strength, improve coordination and increase proprioception to improve the patients ability to return to PLOF     12 min Manual Therapy: STM/TPR to L psoas, TFL, adductors, SL QHF str   Rationale:      decrease pain, increase ROM, increase tissue extensibility and decrease trigger points to improve patient's ability to ambulate    Billed With/As:   [x] TE   [] TA   [] Neuro   [] Self Care Patient Education: [x] Review HEP    [] Progressed/Changed HEP based on:   [] positioning   [] body mechanics   [] transfers   [] heat/ice application    [] other:      Other Objective/Functional Measures:    Significant ttp to the L hip flexors and adductors  Inc groin pain w/ straight leg TRX bridges   Post Treatment Pain Level (on 0 to 10) scale:   0  / 10     ASSESSMENT  Assessment/Changes in Function:     Withheld squat based exercises this visit 2' hip flexor irritation and performed only posterior chain exercises. []  See Progress Note/Recertification   Patient will continue to benefit from skilled PT services to modify and progress therapeutic interventions, address functional mobility deficits, address ROM deficits, address strength deficits, analyze and address soft tissue restrictions, analyze and cue movement patterns, analyze and modify body mechanics/ergonomics, assess and modify postural abnormalities and instruct in home and community integration to attain remaining goals.    Progress toward goals / Updated goals:    No progress towards goals this visit 2' minor decline due to hip flexor pain     PLAN  []  Upgrade activities as tolerated yes Continue plan of care   []  Discharge due to :    []  Other:      Therapist: Danielito Landeros PT    Date: 7/27/2020 Time: 5:01 PM     Future Appointments   Date Time Provider Michelle Luna   7/29/2020  5:15 PM González Horowitz, PT [facility]   8/4/2020  7:45 AM [facility] PT CHILLED POND 2 [facility]   8/6/2020  7:45 AM Jonathan Gonzalez, PTA [facility]   8/11/2020  7:45 AM Jonathan Gonzalez PTA [facility]   8/13/2020  7:45 AM Bina Rooney Benjamin C[place] [facility] [address]   8/18/2020  7:45 AM Nirav Chavez, PTA [facility] [address]   8/20/2020  7:45 AM Marie Love, PT [facility] [address]   8/25/2020  7:45 AM Nirav Chavez, TERRY [facility] [address]   8/27/2020  7:45 AM Marie Love, PT [facility] [address]

## 2020-07-29 ENCOUNTER — HOSPITAL ENCOUNTER (OUTPATIENT)
Dept: PHYSICAL THERAPY | Age: 20
Discharge: HOME | End: 2020-07-29
Payer: COMMERCIAL

## 2020-07-29 PROCEDURE — 97014 ELECTRIC STIMULATION THERAPY: CPT

## 2020-07-29 PROCEDURE — 97140 MANUAL THERAPY 1/> REGIONS: CPT

## 2020-07-29 PROCEDURE — 97110 THERAPEUTIC EXERCISES: CPT

## 2020-07-29 NOTE — PROGRESS NOTES
PHYSICAL THERAPY - DAILY TREATMENT NOTE    Patient Name: Caty Hand        Date: 2020  : 2000   yes Patient  Verified  Visit #:   4   of   8  Insurance: Payor: Eric Lei / Plan: Frederickpriscillasandracali Joaquin / Product Type: HMO /      In time: 5:15 Out time: 6:10   Total Treatment Time: 55     Medicare/BCBS Time Tracking (below)   Total Timed Codes (min):  45 1:1 Treatment Time:  45     TREATMENT AREA =  Other sprain of left hip, initial encounter [S73.192A]    SUBJECTIVE  Pain Level (on 0 to 10 scale):  2  / 10   Medication Changes/New allergies or changes in medical history, any new surgeries or procedures?    no  If yes, update Summary List   Subjective Functional Status/Changes:  []  No changes reported     Pt reports she felt better after last visit but was walking around [employer] this evening and experienced sharp pain in her groin which caused her to limp back to her car.            OBJECTIVE  Modalities Rationale:     decrease inflammation and decrease pain to improve patient's ability to ambulate  10 min [x] Estim, type/location: hivolt to the L hip flexors/adductors                                     []  att     [x]  unatt     []  w/US     [x]  w/ice    []  w/heat    min []  Mechanical Traction: type/lbs                   []  pro   []  sup   []  int   []  cont    []  before manual    []  after manual    min []  Ultrasound, settings/location:      min []  Iontophoresis w/ dexamethasone, location:                                               []  take home patch       []  in clinic    min []  Ice     []  Heat    location/position:     min []  Vasopneumatic Device, press/temp:     min []  Other:    [x] Skin assessment post-treatment (if applicable):    [x]  intact    []  redness- no adverse reaction     []redness  adverse reaction:        8 min Therapeutic Exercise:  [x]  See flow sheet   Rationale:      increase ROM and increase strength to improve the patients ability to complete ADLs     37 min Manual Therapy: STM/TPR to L pectineus, TFL, psoas, prox RF, SL QHF str, long axis distraction   Rationale:      decrease pain, increase ROM, increase tissue extensibility and decrease trigger points to improve patient's ability to ambulate    Billed With/As:   [x] TE   [] TA   [] Neuro   [] Self Care Patient Education: [x] Review HEP    [] Progressed/Changed HEP based on:   [] positioning   [] body mechanics   [] transfers   [] heat/ice application    [] other:      Other Objective/Functional Measures:    (-) [place], (+) scour in faddir position (pain, no catch), (-) bolivar test.   Sig TTP to the L pectineus w/ reproduction of pt's pain     Post Treatment Pain Level (on 0 to 10) scale:   0  / 10     ASSESSMENT  Assessment/Changes in Function:     Performed manual therapy to adductors and hip flexors and gentle stretching only (1/2 kneel QHF, adductor stretches, supine hs stretch, prone quad str) f/b e-stim/ice to reduce tissue tension and improved pain. Pt advised to perform gentle stretching only this weekend. If no change to pt's recent complaints, advised f/u with physician.      []  See Progress Note/Recertification   Patient will continue to benefit from skilled PT services to modify and progress therapeutic interventions, address functional mobility deficits, address ROM deficits, address strength deficits, analyze and address soft tissue restrictions, analyze and cue movement patterns, analyze and modify body mechanics/ergonomics, assess and modify postural abnormalities and instruct in home and community integration to attain remaining goals.    Progress toward goals / Updated goals:    No progress towards goals, continue w/ modified program 2' inc pain     PLAN  []  Upgrade activities as tolerated yes Continue plan of care   []  Discharge due to :    []  Other:      Therapist: Fartun Varela PT    Date: 7/29/2020 Time: 5:49 PM     Future Appointments   Date Time Provider Michelle Luna   8/4/2020 7:45 AM [facility] [place] [facility]   8/6/2020  7:45 AM Taras Tovar, PTA [facility]   8/11/2020  7:45 AM Taras Tovar, PTA [facility]   8/13/2020  7:45 AM Taras Tovar, PTA [facility]   8/18/2020  7:45 AM Taras Tovar, PTA [facility]   8/20/2020  7:45 AM Jennifer Love, PT [facility]   8/25/2020  7:45 AM Taras Tovar, PTA [facility]   8/27/2020  7:45 AM Jennifer Love, PT [facility]

## 2020-08-04 ENCOUNTER — APPOINTMENT (OUTPATIENT)
Dept: PHYSICAL THERAPY | Age: 20
End: 2020-08-04
Payer: COMMERCIAL

## 2020-08-06 ENCOUNTER — APPOINTMENT (OUTPATIENT)
Dept: PHYSICAL THERAPY | Age: 20
End: 2020-08-06
Payer: COMMERCIAL

## 2020-08-07 ENCOUNTER — APPOINTMENT (OUTPATIENT)
Dept: PHYSICAL THERAPY | Age: 20
End: 2020-08-07
Payer: COMMERCIAL

## 2020-08-11 ENCOUNTER — HOSPITAL ENCOUNTER (OUTPATIENT)
Dept: PHYSICAL THERAPY | Age: 20
End: 2020-08-11
Payer: COMMERCIAL

## 2020-08-13 ENCOUNTER — APPOINTMENT (OUTPATIENT)
Dept: PHYSICAL THERAPY | Age: 20
End: 2020-08-13
Payer: COMMERCIAL

## 2020-08-18 ENCOUNTER — HOSPITAL ENCOUNTER (OUTPATIENT)
Dept: PHYSICAL THERAPY | Age: 20
Discharge: HOME | End: 2020-08-18
Payer: COMMERCIAL

## 2020-08-18 PROCEDURE — 97110 THERAPEUTIC EXERCISES: CPT

## 2020-08-18 PROCEDURE — 97140 MANUAL THERAPY 1/> REGIONS: CPT

## 2020-08-18 NOTE — PROGRESS NOTES
PT DAILY TREATMENT NOTE     Patient Name: Ruth Hemp  Date:2020  : 2000  [x]  Patient  Verified  Payor: Jeffrey Alexander / Plan: Ana Guard / Product Type: HMO /    In time:7:55  Out time:8:53  Total Treatment Time (min): 58  Total Timed Codes (min): 53  1:1 Treatment Time (min): 38   Visit #: 5 of 8    Treatment Area: Other sprain of left hip, initial encounter [S73.192A]    SUBJECTIVE  Pain Level (0-10 scale): 0  Any medication changes, allergies to medications, adverse drug reactions, diagnosis change, or new procedure performed?: [x] No    [] Yes (see summary sheet for update)  Subjective functional status/changes:   [] No changes reported  My hip has been doing pretty good lately, I haven't really had any pain since I was here last, so I think that I am fine trying to go back to trying some of the standing exercises.     OBJECTIVE      48 min Therapeutic Exercise:  [x] See flow sheet : TM x 5 minutes N/C   Rationale: increase ROM, increase strength, improve balance and increase proprioception to improve the patients ability to improve functional abilities     10 min Manual Therapy:  Long axis L LE/hip distraction, deep proximal Iliopsoas/hip flexor releases and sidelying hip flexor/quad combo stretching    Rationale: increase ROM, increase tissue extensibility and decrease trigger points to improve functional mobility            min Patient Education: [x] Review HEP    [] Progressed/Changed HEP based on:   [] positioning   [] body mechanics   [] transfers   [] heat/ice application        Other Objective/Functional Measures:     Pain Level (0-10 scale) post treatment: 0    ASSESSMENT/Changes in Function:     Patient will continue to benefit from skilled PT services to modify and progress therapeutic interventions, address functional mobility deficits, address ROM deficits, address strength deficits, analyze and address soft tissue restrictions, analyze and cue movement patterns and instruct in home and community integration to attain remaining goals. []  See Plan of Care  [x]  See progress note/recertification  []  See Discharge Summary         Progress towards goals / Updated goals:  See Progress note/Physician update for full detailed progress towards established goals.     PLAN  [x]  Upgrade activities as tolerated     []  Continue plan of care  []  Update interventions per flow sheet       []  Discharge due to:_  []  Other:_      Fabrizio Trimble PTA 8/18/2020  8:08 AM      Future Appointments   Date Time Provider Michelle Luna   8/20/2020  7:45 AM Racheal House, PT [facility]   8/25/2020  7:45 AM Giuseppe Thomas PTA [facility]   8/27/2020  7:45 AM Jordan Love, PT [facility]

## 2020-08-18 NOTE — PROGRESS NOTES
[facility] PHYSICAL THERAPY  [address], [place], [address] - Phone: [phone]  Fax: [phone]  PROGRESS NOTE  Patient Name: Julianna Green : 2000   Treatment/Medical Diagnosis: Other sprain of left hip, initial encounter [S73.192A]   Referral Source: Rashid Armando MD     Date of Initial Visit: 2020 Attended Visits: 18 Missed Visits: 6     SUMMARY OF TREATMENT  Pt has attended 18 sessions of PT for the tx of L hip s/p L acetabular labral repair (DOS 2020). PT tx has consisted of therex to improve L hip ROM, flexibility, and strength, manual tx for tissue extensibility and joint mobility, modalities prn and HEP. CURRENT STATUS  Patient reports approximately 90% overall improvement from therapy since initial evaluation with no reported increase/reoccurrence of pain over the past 2-3 weeks. Pt presents with only c/o intermittent deep popping primarily with NWB hip flexion activities. Pt has not attempted to return to running as of yet due to protocol limitations. Pt has just returned to therapy after approximately 3 week leave of absence due to recent illness as well as being out of town. Pt was able to resume treatment including resuming most standing strengthening exercises with min to mod challenge and is making steady progress with gaining hip mobility as well as advancing with stabilization and strengthening within current POC. Pt would benefit from continued therapy for 8 additional visits to advance to return to run program as well as to achieve maximum medical benefit/potential from current POC. Will continue to progress/advance patient within current POC as tolerated with monitoring symptoms.     L hip AROM WNL in all planes, pain-free  MMT hip flex 4+/5, abd 5/5, ext 4+/5, add 4+/5  Foto score= 69/100  Goal/Measure of Progress Goal Met? 1.   Pt will back squat 135# for 8-10 reps w/ symmetrical WB to return to PLOF   Status at last Eval: New goal established last assessment Current Status: Not attempted 2' recent flare-up of hip flexor pain no   2. Improve L hip strength to 5/5 MMT to improve hip/pelvic stability w/ ambulation and stairs   Status at last Eval: Hip flex 4-/5, abd 4+/5, add 4/5, ext 4+/5 Current Status: hip flex 4+/5, abd 5/5, ext 4+/5, add 4+/5 Improved, but not fully met     New Goals to be achieved in __8__  treatments:  1. Pt will back squat 135# for 8-10 reps w/ symmetrical WB to return to PLOF  2. Improve L hip strength to 5/5 MMT to improve hip/pelvic stability w/ ambulation and stairs  3. Pt will be able to advance to return to run protocol including treadmill intervals with minimal to no hip pain for return to premorbid gym based activities. RECOMMENDATIONS  Continue with current POC for 8 additional visits at a frequency of 2x/week x 4 weeks with advancing as tolerated, then reassess for the need for continuation or discharge from therapy. If you have any questions/comments please contact us directly at [phone]. Thank you for allowing us to assist in the care of your patient. Therapist Signature: Michell Greco PTA Date: 8/18/2020    Tae Martinez PT, DPT, CMTPT Time: 8:16 AM   NOTE TO PHYSICIAN:  PLEASE COMPLETE THE ORDERS BELOW AND FAX TO   [facility] [place] [facility]: [phone]  If you are unable to process this request in 24 hours please contact our office: [phone]    ___ I have read the above report and request that my patient continue as recommended.   ___ I have read the above report and request that my patient continue therapy with the following changes/special instructions:_________________________________________________________   ___ I have read the above report and request that my patient be discharged from therapy.      Physician Signature:        Date:       Time:

## 2020-08-20 ENCOUNTER — HOSPITAL ENCOUNTER (OUTPATIENT)
Dept: PHYSICAL THERAPY | Age: 20
Discharge: HOME | End: 2020-08-20
Payer: COMMERCIAL

## 2020-08-20 PROCEDURE — 97530 THERAPEUTIC ACTIVITIES: CPT

## 2020-08-20 PROCEDURE — 97110 THERAPEUTIC EXERCISES: CPT

## 2020-08-20 NOTE — PROGRESS NOTES
PHYSICAL THERAPY - DAILY TREATMENT NOTE    Patient Name: Matt Herbert        Date: 2020  : 2000   yes Patient  Verified  Visit #:   1   of   8  Insurance: Payor: Dayana Horta / Plan: Brandie Justin / Product Type: HMO /      In time: 7:47 Out time: 8:58   Total Treatment Time: 71     Medicare/BCBS Time Tracking (below)   Total Timed Codes (min):  61 1:1 Treatment Time:  61     TREATMENT AREA =  Other sprain of left hip, initial encounter [S73.192A]    SUBJECTIVE  Pain Level (on 0 to 10 scale):  0  / 10   Medication Changes/New allergies or changes in medical history, any new surgeries or procedures?    no  If yes, update Summary List   Subjective Functional Status/Changes:  []  No changes reported     Pt reports having no pain in the L hip. Reports hip flexor pain lasted about 2 weeks. OBJECTIVE  Modalities Rationale:     decrease inflammation and decrease pain to improve patient's ability to ambulate   min [] Estim, type/location:                                      []  att     []  unatt     []  w/US     []  w/ice    []  w/heat    min []  Mechanical Traction: type/lbs                   []  pro   []  sup   []  int   []  cont    []  before manual    []  after manual    min []  Ultrasound, settings/location:      min []  Iontophoresis w/ dexamethasone, location:                                               []  take home patch       []  in clinic   10 min [x]  Ice     []  Heat    location/position:  To the L hip in supine    min []  Vasopneumatic Device, press/temp:     min []  Other:    [] Skin assessment post-treatment (if applicable):    []  intact    []  redness- no adverse reaction     []redness  adverse reaction:        28 min Therapeutic Exercise:  [x]  See flow sheet   Rationale:      increase strength to improve the patients ability to return to sport     33 min Therapeutic Activity: [x]  See flow sheet   Rationale:    increase strength, improve coordination and increase proprioception to improve the patients ability to return to sport    Billed With/As:   [] TE   [x] TA   [] Neuro   [] Self Care Patient Education: [x] Review HEP    [] Progressed/Changed HEP based on:   [] positioning   [] body mechanics   [] transfers   [] heat/ice application    [] other:      Other Objective/Functional Measures:    Initiated walk/jog intervals @ 1':30\" x 10' total w/o any pain. Noted inc forefoot strike R>L, could potentially be due to wearing ACL sport brace on the R  Initiated plyometrics (DL to DL) first floor, then 6\", progressing to 12\"; pt demo improved landing mechanics w/ progressive reps, dec power production to achieve 12\" landing     Post Treatment Pain Level (on 0 to 10) scale:   0  / 10     ASSESSMENT  Assessment/Changes in Function:     Progressed therex as per flow to improve strength, power, and lateral stability. Pt denied hip flexor pain throughout session. Pt was provided with copy of return to run program and given specific instructions of progression/regression prn     []  See Progress Note/Recertification   Patient will continue to benefit from skilled PT services to modify and progress therapeutic interventions, address functional mobility deficits, address strength deficits, analyze and address soft tissue restrictions, analyze and cue movement patterns, analyze and modify body mechanics/ergonomics and instruct in home and community integration to attain remaining goals. Progress toward goals / Updated goals:    1. Pt will back squat 135# for 8-10 reps w/ symmetrical WB to return to PLOF 8/20/20: back squat 65# 2x8  2. Improve L hip strength to 5/5 MMT to improve hip/pelvic stability w/ ambulation and stairs  3. Pt will be able to advance to return to run protocol including treadmill intervals with minimal to no hip pain for return to premorbid gym based activities.  8/20/20: initiated TM intervals pain-free this visit 1' walk: 30\" jog         PLAN  []  Upgrade activities as tolerated yes Continue plan of care   []  Discharge due to :    []  Other:      Therapist: Eric Le, PT    Date: 8/20/2020 Time: 8:05 AM     Future Appointments   Date Time Provider Michelle Luna   8/25/2020  7:45 AM Taras Tovar, PTA [facility]   8/27/2020  7:45 AM Jennifer Love, PT [facility]

## 2020-08-27 ENCOUNTER — HOSPITAL ENCOUNTER (OUTPATIENT)
Dept: PHYSICAL THERAPY | Age: 20
Discharge: HOME | End: 2020-08-27
Payer: COMMERCIAL

## 2020-08-27 PROCEDURE — 97110 THERAPEUTIC EXERCISES: CPT

## 2020-08-27 NOTE — PROGRESS NOTES
PHYSICAL THERAPY - DAILY TREATMENT NOTE    Patient Name: Jazmine Dunbar        Date: 2020  : 2000   yes Patient  Verified  Visit #:   2   of   8  Insurance: Payor: Mitchell Crespo / Plan: Thea Wright / Product Type: HMO /      In time: 7:45 Out time: 8:43   Total Treatment Time: 58     Medicare/[facility] Time Tracking (below)   Total Timed Codes (min):  48 1:1 Treatment Time:  48     TREATMENT AREA =  Other sprain of left hip, initial encounter [S73.192A]    SUBJECTIVE  Pain Level (on 0 to 10 scale):  0  / 10 L hip, 8/10 R hip   Medication Changes/New allergies or changes in medical history, any new surgeries or procedures?    no  If yes, update Summary List   Subjective Functional Status/Changes:  []  No changes reported     Pt reports she ran 1/4 mile and walked 3/4 mile on Monday and had no pain in the left hip but notes onset of pain, upwards of 8/10 on the R hip.            OBJECTIVE  Modalities Rationale:     decrease inflammation and decrease pain to improve patient's ability to complete ADLs   min [] Estim, type/location:                                      []  att     []  unatt     []  w/US     []  w/ice    []  w/heat    min []  Mechanical Traction: type/lbs                   []  pro   []  sup   []  int   []  cont    []  before manual    []  after manual    min []  Ultrasound, settings/location:      min []  Iontophoresis w/ dexamethasone, location:                                               []  take home patch       []  in clinic   10 min [x]  Ice     []  Heat    location/position: To B hip in long sit    min []  Vasopneumatic Device, press/temp:     min []  Other:    [x] Skin assessment post-treatment (if applicable):    [x]  intact    []  redness- no adverse reaction     []redness  adverse reaction:        48 min Therapeutic Exercise:  [x]  See flow sheet   Rationale:      increase ROM and increase strength to improve the patients ability to run     Billed With/As:   [x] TE   [] TA   [] Neuro   [] Self Care Patient Education: [x] Review HEP    [] Progressed/Changed HEP based on:   [] positioning   [] body mechanics   [] transfers   [] heat/ice application    [] other:      Other Objective/Functional Measures:    Performed self TPR to the R glutes/piri, piriformis str, and glute med stretch to attempt to reduce pain to the R hip    Therex this visit included: miniband glute prep 10x each, inchworm 5x, WG str 2x B, piri str B, hip flexor str B, incline gastroc stretch B, SL clam R 10x, supine DL bridge 15x, prone hip ext 10x B. Post Treatment Pain Level (on 0 to 10) scale:   0  / 10 L hip, 7/10 R hip     ASSESSMENT  Assessment/Changes in Function:     Limited in ability to complete full program this visit 2' significant pain in the R hip. Pt did however, note ability to run on TM 1/4 mile w/o any pain in the L hip, indicating functional progression     []  See Progress Note/Recertification   Patient will continue to benefit from skilled PT services to modify and progress therapeutic interventions, address functional mobility deficits, address ROM deficits, address strength deficits, analyze and address soft tissue restrictions, analyze and cue movement patterns, analyze and modify body mechanics/ergonomics, assess and modify postural abnormalities and instruct in home and community integration to attain remaining goals. Progress toward goals / Updated goals:    1. Pt will back squat 135# for 8-10 reps w/ symmetrical WB to return to PLOF 8/20/20: back squat 65# 2x8  2. Improve L hip strength to 5/5 MMT to improve hip/pelvic stability w/ ambulation and stairs  3. Pt will be able to advance to return to run protocol including treadmill intervals with minimal to no hip pain for return to premorbid gym based activities.  8/27/20: ran 1/4 mile, walk 3/4 mile three days ago w/o pain L hip     PLAN  []  Upgrade activities as tolerated yes Continue plan of care   []  Discharge due to :    []  Other: Therapist: Chandan Marsh, PT    Date: 8/27/2020 Time: 7:54 AM     No future appointments.

## 2020-09-02 ENCOUNTER — HOSPITAL ENCOUNTER (OUTPATIENT)
Dept: PHYSICAL THERAPY | Age: 20
Discharge: HOME | End: 2020-09-02
Payer: COMMERCIAL

## 2020-09-02 PROCEDURE — 97110 THERAPEUTIC EXERCISES: CPT

## 2020-09-02 NOTE — PROGRESS NOTES
PT DAILY TREATMENT NOTE     Patient Name: Reynaldo Balbuena  Date:2020  : 2000  [x]  Patient  Verified  Payor: Muna Kerr / Plan: Victorino Louis / Product Type: HMO /    In time:5:16  Out time:5:50  Total Treatment Time (min): 34  Total Timed Codes (min): 34  1:1 Treatment Time (min): 34   Visit #: 3 of 8    Treatment Area: Other sprain of left hip, initial encounter [S73.192A]    SUBJECTIVE  Pain Level (0-10 scale): L hip=0; R hip=4/10  Any medication changes, allergies to medications, adverse drug reactions, diagnosis change, or new procedure performed?: [x] No    [] Yes (see summary sheet for update)  Subjective functional status/changes:   [] No changes reported  My left hip feels fine, no pain, but the right one has been bothering me more especially when I am driving from sitting in a low position as well as pushing on the gas and brake. OBJECTIVE    34 min Therapeutic Exercise:  [x] See flow sheet :   Rationale: increase ROM and increase strength to improve the patients ability to improve functional abilities            min Patient Education: [x] Review HEP    [] Progressed/Changed HEP based on:   [] positioning   [] body mechanics   [] transfers   [] heat/ice application        Other Objective/Functional Measures:     Pain Level (0-10 scale) post treatment: L hip=0; R hip=4/10    ASSESSMENT/Changes in Function: Pt was held to only performing bed exercises today to avoid undue stress to R hip before ruling out possibility of labral involvement in R uninvolved hip before MD f/u next week. Pt able to advance to sidelying hip endurance drills with moderate challenge with hip endurance today. Pt is being placed on hold from therapy until after MD f/u next week. Will continue to progress/advance patient within current POC as tolerated with monitoring symptoms.     Patient will continue to benefit from skilled PT services to modify and progress therapeutic interventions, address functional mobility deficits, address ROM deficits, address strength deficits, analyze and cue movement patterns and instruct in home and community integration to attain remaining goals. []  See Plan of Care  []  See progress note/recertification  []  See Discharge Summary         Progress towards goals / Updated goals:  1. Pt will back squat 135# for 8-10 reps w/ symmetrical WB to return to PLOF 8/20/20: back squat 65# 2x8  2. Improve L hip strength to 5/5 MMT to improve hip/pelvic stability w/ ambulation and stairs  3. Pt will be able to advance to return to run protocol including treadmill intervals with minimal to no hip pain for return to premorbid gym based activities. 8/27/20: ran 1/4 mile, walk 3/4 mile three days ago w/o pain L hip    PLAN  [x]  Upgrade activities as tolerated     []  Continue plan of care  []  Update interventions per flow sheet       []  Discharge due to:_  []  Other:_      Agueda Ort, PTA 9/2/2020  5:19 PM      Future Appointments   Date Time Provider Michelle Luna   9/4/2020  7:00 AM Nayla Golas, PTA [facility]   9/10/2020  7:45 AM Nayla Golas, PTA [facility]   9/15/2020  7:45 AM Nayla Golas, PTA [facility]   9/21/2020  3:30 PM Erick Larkin, PT [facility]   9/29/2020  7:45 AM Nayla Golas, PTA [facility]

## 2020-09-04 ENCOUNTER — APPOINTMENT (OUTPATIENT)
Dept: PHYSICAL THERAPY | Age: 20
End: 2020-09-04
Payer: COMMERCIAL

## 2020-09-10 ENCOUNTER — APPOINTMENT (OUTPATIENT)
Dept: PHYSICAL THERAPY | Age: 20
End: 2020-09-10
Payer: COMMERCIAL

## 2020-09-21 ENCOUNTER — HOSPITAL ENCOUNTER (OUTPATIENT)
Dept: PHYSICAL THERAPY | Age: 20
Discharge: HOME | End: 2020-09-21
Payer: COMMERCIAL

## 2020-09-21 PROCEDURE — 97110 THERAPEUTIC EXERCISES: CPT

## 2020-09-21 PROCEDURE — 97140 MANUAL THERAPY 1/> REGIONS: CPT

## 2020-09-21 NOTE — PROGRESS NOTES
[facility] PHYSICAL THERAPY  [address], [place], [address] - Phone: [phone]  Fax: [phone]  PROGRESS NOTE  Patient Name: Tawanna Wen : 2000   Treatment/Medical Diagnosis: Other sprain of left hip, initial encounter [S73.192A]   Referral Source: Ana M Crum MD     Date of Initial Visit: 2020 Attended Visits: 22 Missed Visits: 7     SUMMARY OF TREATMENT  Pt has attended 22 sessions of PT s/p L hip acetabular labral repair w/ femoral osteochondroplasty (DOS 2020). PT tx has consisted of gradual return to sport progression per protocol, including therapeutic exercise, therapeutic activity, neuromuscular reeducation, manual therapy and modalities prn, and HEP instruction. CURRENT STATUS  Pt has made excellent progress in PT s/p L hip labral repair. Pt is currently 4 months post-op and reports 0/10 pain in the L hip. Pt has initiated straight plane light jog/walk program, currently able to jog 3/4 mile continuously, and has returned to weight lifting in gym 3x/week; we have not initiated cutting/pivoting at this time due to protocol restrictions. Pt's c/c in the L hip is tightness. Unfortunately, further progression has been slow/limited due to onset of R hip/groin pain in late August after initiating jogging. Pt c/o pain reaching 8/10 in the R hip, located R posterior hip and deep groin. Pain is made worse with sitting, deep squat, and driving. Reports recent x-ray \"was identical to my left hip\" (suggesting CAM deformity). AROM: hip flex R 102, L 105, abd R 25, L 43, ext R 10, L 4, IR R 39, L 40, ER R 36, L 33  MMT: hip flex R 4+/5, L 5/5, abd R 5/5, L 5/5, add R 4-/5, L 4-/5, ext R 4+/5, L 4/5  FOTO 87/100  Special test R hip: (+) FADIR, anterior labral test. Relief reported w/ lateral and long axis distraction. Goal/Measure of Progress Goal Met? 1.   Pt anneliese back squat 135# for 8-10 reps w/ symmetrical WB to return to PLOF Status at last Eval: Body weight squat x 10 reps w/ mild weight shift R Current Status: 105# x 8 reps symmetrical WB progress   2. Improve L hip strength to 5/5 MMT to improve hip/pelvic stability w/ ambulation and stairs   Status at last Eval: Flex 4+/5, abd 5/5, ext 4+/5, add 4+/5 Current Status: See above no   3. Pt will be able to advance to return to run protocol including TM intervals w/ minimal to no hip pain for return to premorbid gym based activities   Status at last Eval: Not initiated Current Status: Jog/walk TM and track intervals, currently jogging 3/4 mile, walking 1/4 yes     New Goals to be achieved in __4__  weeks:  1. Pt will back squat for 8-10 reps w/ symmetrical WB for return to PLOF  2. Pt will demo at least 85% LSI on SL hop tests to indicate appropriate functional strength for return to sport  3. Improve B hip adduction strength to at least 4/5 to improve LE stability for high level cutting/pivoting    RECOMMENDATIONS  Plan to incorporate treatment to R hip with current L hip treatment for remaining 8 visits (pt has 8 visits remaining covered under fiscal year insurance plan). Continue PT 1-2x/wk for 4 weeks, progressing sport specific agility drills as tolerated to progress pt towards return to PLOF. If you have any questions/comments please contact us directly at [phone]. Thank you for allowing us to assist in the care of your patient.     Therapist Signature: Rashad Parsons PT Date: 9/21/2020     Time: 5:53 PM   NOTE TO PHYSICIAN:  PLEASE COMPLETE THE ORDERS BELOW AND FAX TO   [facility]: [phone]  If you are unable to process this request in 24 hours please contact our office: [phone]    ___ I have read the above report and request that my patient continue as recommended.   ___ I have read the above report and request that my patient continue therapy with the following changes/special instructions:_________________________________________________________   ___ I have read the above report and request that my patient be discharged from therapy.      Physician Signature:        Date:       Time:

## 2020-09-21 NOTE — PROGRESS NOTES
PHYSICAL THERAPY - DAILY TREATMENT NOTE    Patient Name: Tamiko Kiran        Date: 2020  : 2000   yes Patient  Verified  Visit #:   4   of   8  Insurance: Payor: Katherine Keith / Plan: Serina Darling / Product Type: HMO /      In time: 3:35 Out time: 4:29   Total Treatment Time: 54     Medicare/[facility] Time Tracking (below)   Total Timed Codes (min):  54 1:1 Treatment Time:  54     TREATMENT AREA =  Other sprain of left hip, initial encounter [S73.274A]    SUBJECTIVE  Pain Level (on 0 to 10 scale):  0  / 10 (R 3/10)   Medication Changes/New allergies or changes in medical history, any new surgeries or procedures?    no  If yes, update Summary List   Subjective Functional Status/Changes:  []  No changes reported     Pt reports Dr. Jesus Negron took an x-ray of her R hip and stated it was \"idenitcal to the L hip\". Advised rehab on B hips x 2 mo and if no change will order MRA.      Max pain L hip 0/10 (R hip 8/10 after x-rays and MD apt)  C/c for L hip is tightness  Going to gym weight lifting and running 3x/week (has reached up to running 3/4 mile)         OBJECTIVE    44 min Therapeutic Exercise:  [x]  See flow sheet   Rationale:      increase ROM, increase strength, improve coordination and increase proprioception to improve the patients ability to return to PLOF     10 min Manual Therapy: R hip lateral distraction and inf mobs w/ belt; STM to R TFL, deep psoas release   Rationale:      decrease pain, increase ROM, increase tissue extensibility and decrease trigger points to improve patient's ability to complete ADLs    Billed With/As:   [x] TE   [] TA   [] Neuro   [] Self Care Patient Education: [x] Review HEP    [] Progressed/Changed HEP based on:   [] positioning   [] body mechanics   [] transfers   [] heat/ice application    [] other:      Other Objective/Functional Measures:    AROM hip flex R 102, L 105, abd R 25, L 32, ext R 10, L 4, IR R 39, L 40, ER R 36, L 33  MMT: hip flex R 4+/5, L 5/5, abd R 5/5, L 5/5, add R 4-/5, L 4-/5, ext R 4+/5, L 4/5  FOTO 87/100  Back squat 105# x 8 reps     Post Treatment Pain Level (on 0 to 10) scale:   0  / 10     ASSESSMENT  Assessment/Changes in Function:     See PN     [x]  See Progress Note/Recertification   Patient will continue to benefit from skilled PT services to modify and progress therapeutic interventions, address functional mobility deficits, address ROM deficits, address strength deficits, analyze and address soft tissue restrictions, analyze and cue movement patterns, analyze and modify body mechanics/ergonomics, assess and modify postural abnormalities and instruct in home and community integration to attain remaining goals.    Progress toward goals / Updated goals:    See PN     PLAN  []  Upgrade activities as tolerated yes Continue plan of care   []  Discharge due to :    []  Other:      Therapist: Betina Cox PT    Date: 9/21/2020 Time: 3:46 PM     Future Appointments   Date Time Provider Michelle Luna   9/29/2020  7:45 AM Giuseppe Thomas PTA [facility]

## 2020-10-20 ENCOUNTER — APPOINTMENT (OUTPATIENT)
Dept: PHYSICAL THERAPY | Age: 20
End: 2020-10-20
Payer: COMMERCIAL

## 2020-10-22 ENCOUNTER — HOSPITAL ENCOUNTER (OUTPATIENT)
Dept: PHYSICAL THERAPY | Age: 20
Discharge: HOME | End: 2020-10-22
Payer: COMMERCIAL

## 2020-10-22 PROCEDURE — 97110 THERAPEUTIC EXERCISES: CPT

## 2020-10-22 PROCEDURE — 97140 MANUAL THERAPY 1/> REGIONS: CPT

## 2020-10-22 NOTE — PROGRESS NOTES
PHYSICAL THERAPY - DAILY TREATMENT NOTE    Patient Name: Fernandez Boles        Date: 10/22/2020  : 2000   yes Patient  Verified  Visit #:   5   of   8  Insurance: Payor: Ariel Hernandez / Plan: Judith Lemons / Product Type: HMO /      In time: 4:15 PM Out time: 5:24 PM   Total Treatment Time: 69     Medicare/[employer] Time Tracking (below)   Total Timed Codes (min):  69 1:1 Treatment Time:  69     TREATMENT AREA =  Other sprain of left hip, initial encounter [S73.192A]    SUBJECTIVE  Pain Level (on 0 to 10 scale):  0  / 10 (R 3-4/10)   Medication Changes/New allergies or changes in medical history, any new surgeries or procedures?    no  If yes, update Summary List   Subjective Functional Status/Changes:  []  No changes reported     See PN       OBJECTIVE    54 min Therapeutic Exercise:  [x]  See flow sheet   Rationale:      increase ROM, increase strength, improve coordination and increase proprioception to improve the patients ability to return to PLOF     15 min Manual Therapy: STM b/l GM, ITB in sidelying   Rationale:      decrease pain, increase ROM, increase tissue extensibility and decrease trigger points to improve patient's ability to complete ADLs    Billed With/As:   [x] TE   [] TA   [] Neuro   [] Self Care Patient Education: [x] Review HEP    [] Progressed/Changed HEP based on:   [] positioning   [] body mechanics   [] transfers   [] heat/ice application    [] other:      Other Objective/Functional Measures:    See PN   Post Treatment Pain Level (on 0 to 10) scale:   0  / 10 left, 1/10 Right hip     ASSESSMENT  Assessment/Changes in Function:     See PN     [x]  See Progress Note/Recertification   Patient will continue to benefit from skilled PT services to modify and progress therapeutic interventions, address functional mobility deficits, address ROM deficits, address strength deficits, analyze and address soft tissue restrictions, analyze and cue movement patterns, analyze and modify body mechanics/ergonomics, assess and modify postural abnormalities and instruct in home and community integration to attain remaining goals. Progress toward goals / Updated goals:    See PN     PLAN  []  Upgrade activities as tolerated yes Continue plan of care   []  Discharge due to :    []  Other:      Therapist: Jhony Henderson.  Kayli Spivey, PT    Date: 10/22/2020 Time: 5:25 PM      Future Appointments   Date Time Provider Michelle Luna   10/22/2020  4:15 PM Criselda Sanchez., PT [facility]   10/27/2020  4:15 PM Criselda Sanchez., PT [facility]

## 2020-10-22 NOTE — PROGRESS NOTES
[facility] PHYSICAL THERAPY  [address], [place], [address] - Phone: [phone]  Fax: [phone]  PROGRESS NOTE  Patient Name: Shawn Ball : 2000   Treatment/Medical Diagnosis: Other sprain of left hip, initial encounter [S73.192A]   Referral Source: Michael Benito MD     Date of Initial Visit: 2020 Attended Visits: 23 Missed Visits: 9     SUMMARY OF TREATMENT  Pt has attended 23 sessions of PT s/p L hip acetabular labral repair w/ femoral osteochondroplasty (DOS 2020). PT tx has consisted of gradual return to sport progression per protocol, including therapeutic exercise, therapeutic activity, neuromuscular reeducation, manual therapy and modalities prn, and HEP instruction. CURRENT STATUS   Pt has not been back to PT since last reassessment on 20 due to scheduling difficulties with her work schedule. This has interfered with ability to progress with b/l hip rehab. Pt rates overall improvement as 100% for Left hip, and no improvement with R hip pain. She has been performing her band HEP as prescribed over the last month, but feels her pain has worsened to a constant pain now which interferes with ability to sleep. Pt running a mile at least 2 x/wk and limited by her R hip pain. States her left hip is tight, but not limited with activities; occasional pop, but not painful. Current objective impairments:  max pain L 0/10, R 8/10 (running, sleeping, prolonged sitting/standing); average daily pain L 0/10, R 3-4/10;  Least pain L 0/10, R 3/10   STRENGTH:  MMT Left hip grossly 5/5 except adduction 4+/5; R hip grossly 4+/5 throughout   Leg Symmetry Index= 71.5% for RLE (R leg is 71.5% symmetrical to LLE)  PALPATION:  Pt with ttp and hypertonicity noted to b/l GM, TFL, ITB.   Current functional limitations:  R hip interferes with ability to tolerate driving, sleeping, standing/walking for work      Goal/Measure of Progress Goal Met?   1. Pt will back squat for 8-10 reps w/ symmetrical WB for return to PLOF   Status at last Eval: Body weight squat x 10 reps w/ mild weight shift R Current Status: 135# x 8 reps symmetrical WB yes   2. Pt will demo at least 85% LSI on SL hop tests to indicate appropriate functional strength for return to sport   Status at last Eval: N/A Current Status: 71.5% for R vs LLE Met for LLE, not met for RLE. 3.  Improve B hip adduction strength to at least 4/5 to improve LE stability for high level cutting/pivoting   Status at last Eval: add R 4-/5, L 4-/5 (9/21/20) Current Status: R 4+/5, L 4+/5 yes     New Goals to be achieved in __4__  weeks:  1. Pt will be able to jog on TM for 10-15 min continuously at 5.5 MPH or greater with good form to achieve personal goal to return to premorbid running activity. 2. Patient will report at least 50% functional improvement with sleeping tolerance related to R hip pain. 3. Pt will note pain less than or equal to 2/10 at worst with driving to promote improved tolerance. 4. Pt will report >/=50% improvement in standing/walking tolerance with work duties related to R hip pain. RECOMMENDATIONS  Pt has been educated on need for improved attendance with PT to promote ability to appropriately progress with R hip pain rehab program; pt verbalized understanding and agreement. Recommend PT 1-2x/wk for 4 weeks, with emphasis on LTG's to promote improvement in R hip pain/function. If you have any questions/comments please contact us directly at [phone]. Thank you for allowing us to assist in the care of your patient. Therapist Signature: Diandra Knutson PT Date: 10/22/2020     Time: 5:47 PM    NOTE TO PHYSICIAN:  PLEASE COMPLETE THE ORDERS BELOW AND FAX TO   [facility]: [phone]  If you are unable to process this request in 24 hours please contact our office: [phone]    ___ I have read the above report and request that my patient continue as recommended.   ___ I have read the above report and request that my patient continue therapy with the following changes/special instructions:_________________________________________________________   ___ I have read the above report and request that my patient be discharged from therapy.      Physician Signature:        Date:       Time:

## 2020-10-26 ENCOUNTER — HOSPITAL ENCOUNTER (OUTPATIENT)
Dept: PHYSICAL THERAPY | Age: 20
Discharge: HOME | End: 2020-10-26
Payer: COMMERCIAL

## 2020-10-26 PROCEDURE — 97110 THERAPEUTIC EXERCISES: CPT

## 2020-10-26 NOTE — PROGRESS NOTES
PHYSICAL THERAPY - DAILY TREATMENT NOTE    Patient Name: Samantha Rainey        Date: 10/26/2020  : 2000   yes Patient  Verified  Visit #:     Insurance: Payor: Jos Milligan / Plan: Tamia Wilcox / Product Type: HMO /      In time: 7:05 AM Out time: 7:55   Total Treatment Time: 50     Medicare/[facility] Time Tracking (below)   Total Timed Codes (min):  45 1:1 Treatment Time:  45     TREATMENT AREA =  Other sprain of left hip, initial encounter [S73.192A]    SUBJECTIVE  Pain Level (on 0 to 10 scale):  0  / 10 (R 3/10)   Medication Changes/New allergies or changes in medical history, any new surgeries or procedures?    no  If yes, update Summary List   Subjective Functional Status/Changes:  []  No changes reported     Pt reports R hip continues to bother her more than her Left. OBJECTIVE    45 min Therapeutic Exercise:  [x]  See flow sheet (-5 min Lat X)   Rationale:      increase ROM, increase strength, improve coordination and increase proprioception to improve the patients ability to return to PLOF     PD min Manual Therapy: STM b/l GM, ITB in sidelying   Rationale:      decrease pain, increase ROM, increase tissue extensibility and decrease trigger points to improve patient's ability to complete ADLs    Billed With/As:   [x] TE   [] TA   [] Neuro   [] Self Care Patient Education: [x] Review HEP    [] Progressed/Changed HEP based on:   [] positioning   [] body mechanics   [] transfers   [] heat/ice application    [] other:      Other Objective/Functional Measures:  FOTO 71 (pt reporting based on aggravated R hip sx's and L hip sx's vs previously only reporting on L hip sx's).      -exercises/tx modified today to emphasize treatment of R hip pain as per POC/PN completed last session  -added Lat X warm up  -regressed to DL bridge with band with cues to wt shift to RLE as tolerated (unable to tolerate SL bridge RLE)  -cued to avoid l/s ext with QP donkey kick  -Dead Bug level 3 limited to 10 reps due to c/o R hip pain     Post Treatment Pain Level (on 0 to 10) scale:   0  / 10 left, 0/10 Right hip     ASSESSMENT  Assessment/Changes in Function:     Pt performed all exercises to tolerance for R hip pain c/o today. Pt issued updated HEP for R hip (see chart copy). Pt significantly challenged with planks; also noted R shoulder with previous h/o dislocation in remote past which limits performance (pt with upper body ipsilateral rotation and UE bracing to table arm for increased stability). [x]  See Progress Note/Recertification   Patient will continue to benefit from skilled PT services to modify and progress therapeutic interventions, address functional mobility deficits, address ROM deficits, address strength deficits, analyze and address soft tissue restrictions, analyze and cue movement patterns, analyze and modify body mechanics/ergonomics, assess and modify postural abnormalities and instruct in home and community integration to attain remaining goals. Progress toward goals / Updated goals:    No significant changes from PN completed last session     PLAN  []  Upgrade activities as tolerated yes Continue plan of care   []  Discharge due to :    []  Other:      Therapist: Gene Fuchs, PT    Date: 10/26/2020 Time: 7:56 AM      Future Appointments   Date Time Provider Michelle Luna   10/28/2020  7:15 AM Pat Horn., PT [facility]   11/2/2020  7:45 AM Pat Horn., PT [facility]   11/4/2020  7:00 AM Pat Horn., PT [facility]

## 2020-10-27 ENCOUNTER — APPOINTMENT (OUTPATIENT)
Dept: PHYSICAL THERAPY | Age: 20
End: 2020-10-27
Payer: COMMERCIAL

## 2020-10-28 ENCOUNTER — HOSPITAL ENCOUNTER (OUTPATIENT)
Dept: PHYSICAL THERAPY | Age: 20
Discharge: HOME | End: 2020-10-28
Payer: COMMERCIAL

## 2020-10-28 PROCEDURE — 97140 MANUAL THERAPY 1/> REGIONS: CPT

## 2020-10-28 PROCEDURE — 97110 THERAPEUTIC EXERCISES: CPT

## 2020-10-28 NOTE — PROGRESS NOTES
PHYSICAL THERAPY - DAILY TREATMENT NOTE    Patient Name: Lavon Ny        Date: 10/28/2020  : 2000   yes Patient  Verified  Visit #:     Insurance: Payor: Ranjit Butler / Plan: Benton Cedeño / Product Type: HMO /      In time: 7:15 AM Out time: 7:40   Total Treatment Time: 25     Medicare/[facility] Time Tracking (below)   Total Timed Codes (min):  25 1:1 Treatment Time:  25     TREATMENT AREA =  Other sprain of left hip, initial encounter [S73.192A]    SUBJECTIVE  Pain Level (on 0 to 10 scale):  0  / 10 (R 4/10)   Medication Changes/New allergies or changes in medical history, any new surgeries or procedures?    no  If yes, update Summary List   Subjective Functional Status/Changes:  []  No changes reported     Pt reports increased pain after last session which persisted to following day. Went to bed last night with increased pain and woke at a 6/10; pain improved as she started moving through her morning. OBJECTIVE    15 min Therapeutic Exercise:  [x]  See flow sheet (-5 min Lat X)   Rationale:      increase ROM, increase strength, improve coordination and increase proprioception to improve the patients ability to return to PLOF     10 min Manual Therapy: STM with lacrosse ball to R iliopsoas in supine. Grade 3-4 belt mobs R hip inf/lat/LAD. Rationale:      decrease pain, increase ROM, increase tissue extensibility and decrease trigger points to improve patient's ability to complete ADLs    Billed With/As:   [x] TE   [] TA   [] Neuro   [] Self Care Patient Education: [x] Review HEP    [] Progressed/Changed HEP based on:   [] positioning   [] body mechanics   [] transfers   [] heat/ice application    [] other:      Other Objective/Functional Measures:  Time constraint for treatment; pt has to leave by 7:40    -pt c/o \"pop\" with modified eileen stretch to anterior hip; states painful; \"I think that's why the dead bugs hurt b/c it does that\".   -added SLR flexion with sport cord assist for gentle hip flexor strengthening; moderate band resistance and reporting min pain at 22 reps. Post Treatment Pain Level (on 0 to 10) scale:   0  / 10 left, 4/10 Right hip     ASSESSMENT  Assessment/Changes in Function:     Pt with decreased tolerance for exercises as performed at last session and treatment was modified appropriately and also limited due to time constraints. Pt advised to continue with today's exercises for HEP as tolerated over the weekend. [x]  See Progress Note/Recertification   Patient will continue to benefit from skilled PT services to modify and progress therapeutic interventions, address functional mobility deficits, address ROM deficits, address strength deficits, analyze and address soft tissue restrictions, analyze and cue movement patterns, analyze and modify body mechanics/ergonomics, assess and modify postural abnormalities and instruct in home and community integration to attain remaining goals. Progress toward goals / Updated goals:    New Goals to be achieved in __4__  weeks:  1. Pt will be able to jog on TM for 10-15 min continuously at 5.5 MPH or greater with good form to achieve personal goal to return to premorbid running activity. -10/28: goal not met; unable to tolerate plyometric activities to R hip at this time due to pain  2. Patient will report at least 50% functional improvement with sleeping tolerance related to R hip pain. 3. Pt will note pain less than or equal to 2/10 at worst with driving to promote improved tolerance. 4. Pt will report >/=50% improvement in standing/walking tolerance with work duties related to R hip pain. PLAN  []  Upgrade activities as tolerated yes Continue plan of care   []  Discharge due to :    []  Other:      Therapist: Kyle Mart.  Rani Toure PT    Date: 10/28/2020 Time: 7:46 AM      Future Appointments   Date Time Provider Michelle Luna   11/2/2020  7:45 AM Arlin Mcginnis., PT [facility]   11/4/2020  7:00 AM [facility] [address]

## 2020-11-04 ENCOUNTER — HOSPITAL ENCOUNTER (OUTPATIENT)
Dept: PHYSICAL THERAPY | Age: 20
Discharge: HOME | End: 2020-11-04
Payer: COMMERCIAL

## 2020-11-04 PROCEDURE — 97110 THERAPEUTIC EXERCISES: CPT

## 2020-11-04 PROCEDURE — 97140 MANUAL THERAPY 1/> REGIONS: CPT

## 2020-11-04 NOTE — PROGRESS NOTES
PHYSICAL THERAPY - DAILY TREATMENT NOTE    Patient Name: Efrain Carrillo        Date: 2020  : 2000   yes Patient  Verified  Visit #:   32   of   32  Insurance: Payor: Rock Donald / Plan: [employer]  / Product Type: HMO /      In time: 7:10 AM Out time: 8:00   Total Treatment Time: 50     Medicare/BCBS Time Tracking (below)   Total Timed Codes (min):  40 1:1 Treatment Time:  40     TREATMENT AREA =  Other sprain of left hip, initial encounter [S73.192A]    SUBJECTIVE  Pain Level (on 0 to 10 scale):  0  / 10- left hip (R 0/10)   Medication Changes/New allergies or changes in medical history, any new surgeries or procedures?    no  If yes, update Summary List   Subjective Functional Status/Changes:  []  No changes reported     Pt states she has been compliant with her HEP and her R hip has been tolerating it well. 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      OBJECTIVE  Modality rationale: decrease inflammation and decrease pain to improve the patients ability to tolerate standing/walking activities   Min Type Additional Details    [] Estim:  []Unatt       []IFC  []Premod                        []Other:  []w/ice   []w/heat  Position:  Location:    [] Estim: []Att    []TENS instruct  []NMES                    []Other:  []w/US   []w/ice   []w/heat  Position:  Location:    []  Traction: [] Cervical       []Lumbar                       [] Prone          []Supine                       []Intermittent   []Continuous Lbs:  [] before manual  [] after manual    []  Ultrasound: []Continuous   [] Pulsed                           []1MHz   []3MHz Location:  W/cm2:    []  Iontophoresis with dexamethasone         Location: [] Take home patch   [] In clinic   10 [x]  Ice     []  heat   Position: supine  Location: R hip    []  Laser with stim  [] Other: Position:  Location:    []  Vasopneumatic Device Pressure:       [] lo [] med [] hi   Temperature: [] lo [] med [] hi   [] Skin assessment post-treatment:  []intact []redness- no adverse reaction    []redness  adverse reaction:       30 min Therapeutic Exercise:  [x]  See flow sheet (-5 min Lat X)   Rationale:      increase ROM, increase strength, improve coordination and increase proprioception to improve the patients ability to return to PLOF     10 min Manual Therapy: STM to R iliopsoas in supine. Grade 3-4 belt mobs R hip inf/lat/LAD. Rationale:      decrease pain, increase ROM, increase tissue extensibility and decrease trigger points to improve patient's ability to complete ADLs    Billed With/As:   [x] TE   [] TA   [] Neuro   [] Self Care Patient Education: [x] Review HEP    [] Progressed/Changed HEP based on:   [] positioning   [] body mechanics   [] transfers   [] heat/ice application    [] other:      Other Objective/Functional Measures:    -added prone hip IR with band at ankles  -avoided pressure/stress to painful R knee today with exercises  -added hip ER/piriformis stretches RLE     Post Treatment Pain Level (on 0 to 10) scale:   0  / 10 left, 0/10 Right hip     ASSESSMENT  Assessment/Changes in Function:     Pt demonstrates good tolerance for exercises performed today without reported pain exacerbation indicating good strength progression and tissue healing to R hip. Pt does continue to have end range pain c/o to deep hip with passive hip ER. Avoided over stressing hip flexor with exercises. Plan to continue progression of exercises and HEP; advised pt may decrease to 1 x/wk if compliant with her HEP.      [x]  See Progress Note/Recertification   Patient will continue to benefit from skilled PT services to modify and progress therapeutic interventions, address functional mobility deficits, address ROM deficits, address strength deficits, analyze and address soft tissue restrictions, analyze and cue movement patterns, analyze and modify body mechanics/ergonomics, assess and modify postural abnormalities and instruct in home and community integration to attain remaining goals. Progress toward goals / Updated goals:    New Goals to be achieved in __4__  weeks:  1. Pt will be able to jog on TM for 10-15 min continuously at 5.5 MPH or greater with good form to achieve personal goal to return to premorbid running activity. -10/28: goal not met; unable to tolerate plyometric activities to R hip at this time due to pain  2. Patient will report at least 50% functional improvement with sleeping tolerance related to R hip pain. -11/4: goal not met; no change  3. Pt will note pain less than or equal to 2/10 at worst with driving to promote improved tolerance. 4. Pt will report >/=50% improvement in standing/walking tolerance with work duties related to R hip pain. -11/4: goal progressing; reports 25% improvement. PLAN  []  Upgrade activities as tolerated yes Continue plan of care   []  Discharge due to :    []  Other:      Therapist: Helio Dillon.  Babetta Phalen, PT    Date: 11/4/2020 Time: 7:54 AM      Future Appointments   Date Time Provider Michelle Luna   11/4/2020  7:00 AM Carlos Knox, PT [facility]

## 2020-11-11 ENCOUNTER — HOSPITAL ENCOUNTER (OUTPATIENT)
Dept: PHYSICAL THERAPY | Age: 20
Discharge: HOME | End: 2020-11-11
Payer: COMMERCIAL

## 2020-11-11 PROCEDURE — 97140 MANUAL THERAPY 1/> REGIONS: CPT

## 2020-11-11 PROCEDURE — 97110 THERAPEUTIC EXERCISES: CPT

## 2020-11-11 NOTE — PROGRESS NOTES
PHYSICAL THERAPY - DAILY TREATMENT NOTE    Patient Name: Marjorie Barakat        Date: 2020  : 2000   yes Patient  Verified  Visit #:   32   of   31  Insurance: Payor: Karen Dumont / Plan: Lisa Young / Product Type: HMO /      In time: 3:27 Out time: 4:13   Total Treatment Time: 46     Medicare/[facility] Time Tracking (below)   Total Timed Codes (min):  46 1:1 Treatment Time:  46     TREATMENT AREA =  Other sprain of left hip, initial encounter [S73.192A]    SUBJECTIVE  Pain Level (on 0 to 10 scale):    / 10   Medication Changes/New allergies or changes in medical history, any new surgeries or procedures?    no  If yes, update Summary List   Subjective Functional Status/Changes:  []  No changes reported     Pt reports her R knee continues to be bothersome and is unstable. Reports her R hip had been feeling better while she was off of work for a week, but notes working a 9 hour day f/b 10 hour day Monday and Tuesday and notes significant inc in pain. OBJECTIVE    36 min Therapeutic Exercise:  [x]  See flow sheet   Rationale:      increase ROM and increase strength to improve the patients ability to ambulate     10 min Manual Therapy: Iliopsoas release, glute med release, R hip inf/lat mobs w/ belt, gentle ER str   Rationale:      decrease pain, increase ROM, increase tissue extensibility and decrease trigger points to improve patient's ability to complete transfers  The manual therapy interventions were performed at a separate and distinct time from the therapeutic activities interventions.     Billed With/As:   [x] TE   [] TA   [] Neuro   [] Self Care Patient Education: [x] Review HEP    [] Progressed/Changed HEP based on:   [] positioning   [] body mechanics   [] transfers   [] heat/ice application    [] other:      Other Objective/Functional Measures:    Cont w/ sig ttp to the R anterior hip musculature  Able to perform IR and ER clamshells in SL w/o R knee pain  C/o R anterior hip popping when returning to extension from flexed position; dec w/ cueing for core contraction     Post Treatment Pain Level (on 0 to 10) scale:   0  / 10     ASSESSMENT  Assessment/Changes in Function:     Pt c/c is R knee pain/instability recently. Planning to discuss with hip surgeon tomorrow and hoping to schedule ACL-R. Will discuss plan going forward at next session     []  See Progress Note/Recertification   Patient will continue to benefit from skilled PT services to modify and progress therapeutic interventions, address functional mobility deficits, address ROM deficits, address strength deficits, analyze and address soft tissue restrictions, analyze and cue movement patterns, analyze and modify body mechanics/ergonomics, assess and modify postural abnormalities and instruct in home and community integration to attain remaining goals. Progress toward goals / Updated goals:    New Goals to be achieved in __4__  weeks:  1. Pt will be able to jog on TM for 10-15 min continuously at 5.5 MPH or greater with good form to achieve personal goal to return to premorbid running activity. -10/28: goal not met; unable to tolerate plyometric activities to R hip at this time due to pain  2. Patient will report at least 50% functional improvement with sleeping tolerance related to R hip pain. -11/4: goal not met; no change  3. Pt will note pain less than or equal to 2/10 at worst with driving to promote improved tolerance. 11/11/20: 3/10 max pain R hip in last week; goal in progress  4. Pt will report >/=50% improvement in standing/walking tolerance with work duties related to R hip pain. -11/4: goal progressing; reports 25% improvement.        PLAN  []  Upgrade activities as tolerated yes Continue plan of care   []  Discharge due to :    []  Other:      Therapist: Esta Goldberg, PT    Date: 11/11/2020 Time: 3:55 PM     Future Appointments   Date Time Provider Michelle Luna   11/25/2020  2:45 PM [facility] 2 [facility]

## 2020-11-25 ENCOUNTER — APPOINTMENT (OUTPATIENT)
Dept: PHYSICAL THERAPY | Age: 20
End: 2020-11-25
Payer: COMMERCIAL

## 2022-05-19 ENCOUNTER — HOSPITAL ENCOUNTER (OUTPATIENT)
Dept: PHYSICAL THERAPY | Age: 22
Discharge: HOME | End: 2022-05-19
Payer: COMMERCIAL

## 2022-05-19 PROCEDURE — 97110 THERAPEUTIC EXERCISES: CPT

## 2022-05-19 PROCEDURE — 97161 PT EVAL LOW COMPLEX 20 MIN: CPT

## 2022-05-19 NOTE — PROGRESS NOTES
[address] [facility] AT [address], [address] [place] [address] Road  Phone: [phone]   [phone]  PLAN OF CARE / [address] [place] [address] PHYSICAL THERAPY SERVICES  Patient Name: Pinky Padilla : 2000   Medical   Diagnosis: S/p R acetabular labral tear, acetabular rim recession, femoral osteochondroplasty, capsular plication   Treatment Diagnosis: Pain in right hip [M25.551]   Onset Date: DOS: 2022     Referral Source: Kirstie Guajardo MD [place] [facility]): 2022   Prior Hospitalization: See medical history Provider #: [id number]   Prior Level of Function: Lives in 2nd floor apartment with 1 HR; previously able to ambulate without AD, ind with all transfers and ADLs   Comorbidities: Alcohol use; hx L acetabular labral repair ; Hx R knee scope x 2 (, )   Medications: Verified on Patient Summary List   The Plan of Care and following information is based on the information from the initial evaluation.   ===========================================================================================  Assessment / key information: Pt is a 20 y/o F who presents to PT w/ c/o L hip pain s/p L acetabular labral repair, acetabular rim recession, femoral osteochondroplasty, and capsular plication DOS . Pt has previously been treated in this facility s/p R acetabular labral repair, therefore PMH and PSH is well know to this PT. Pt notes pain ranges 3 to 6/10 since surgery, made worse with initial standing, in/out of car, laying flat supine; better with icing, pain medication, sleeping reclined on couch. Pt currently WBAT with B axillary crutches. Describes pain as dull and aching, located R posterolateral hip. Red flags negative. FOTO 23/100, indicating 77% functional impairment to activity.      Clinical Exam Findings:  POSTURE/OBSERVATION: forward flexed posturing with hinged hip brace donned correctly with 90 deg flex and 0 deg ext stops. Surgical portals covered with dressings, no visible signs of drainage or infection. Pt advised to removed tomorrow as per post-op instructions. Reviewed s/s of infection. FUNCTIONAL ASSESSMENT: gait WBAT (~25% WB) R LE with B axillar crutch, dec step length R. transfers sit to stand with B UE support; sit->supine with CG A to R LE, stairs NR with B 1 crutch and 1 HR (cues for sequencing), bed mobility independent rolling supine<>sl and sl<>prone; TA draw fair-; bridge 25% AROM     ROM:  PROM RIGHT LEFT   Hip flex 85 105   Hip abd 28 35   Hip ER (short sit) 20 32   Hip IR (short sit) NT 35   Hip ext -5 8   Knee flex 0-110 0-125     STRENGTH:  MMT RIGHT LEFT   Hip flex NT 4/5   Hip abd NT 4/5 (in sitting)   Hip add NT 4/5 (in sitting)   Hip ER NT 4+/5   Hip IR NT 4-/5   Hip ext NT 3+/5   Quad set Fair+ normal   Knee flex 4/5 4+/5   Knee ext 4-/5 4+/5   Ankle DF 5/5 5/5   Ankle PF 2+/5 4/5     SPECIAL TEST: (-) meghana's sign     Pt presents w/ sx suggesting/consistent with surgical procedure. Pt was educated in post-op restrictions/precautions, yellow/red flags, and HEP. Pt could benefit from PT to address impairments and functional limitations in order to improve pt's ability to complete ADLs.  ===========================================================================================  Eval Complexity: History MEDIUM  Complexity : 1-2 comorbidities / personal factors will impact the outcome/ POC ;  Examination  MEDIUM Complexity : 3 Standardized tests and measures addressing body structure, function, activity limitation and / or participation in recreation ; Presentation MEDIUM Complexity : Evolving with changing characteristics ;   Decision Making HIGH Complexity : FOTO score of 1- 25 ; Overall Complexity MEDIUM  Problem List: pain affecting function, decrease ROM, decrease strength, impaired gait/ balance, decrease ADL/ functional abilitiies, decrease activity tolerance, decrease flexibility/ joint mobility and decrease transfer abilities   Treatment Plan may include any combination of the following: Therapeutic exercise, Therapeutic activities, Neuromuscular re-education, Physical agent/modality, Gait/balance training, Manual therapy, Patient education, Self Care training, Functional mobility training, Home safety training and Stair training  Patient / Family readiness to learn indicated by: asking questions, trying to perform skills and interest  Persons(s) to be included in education: patient (P)  Barriers to Learning/Limitations: None  Measures taken, if barriers to learning:    Patient Goal (s): \"full recovery- full ROM and no pain\"   Patient self reported health status: good  Rehabilitation Potential: good   Short Term Goals: To be accomplished in  1  weeks:  1. Pt will be I and compliant with HEP for self management of sx    Long Term Goals: To be accomplished in  4  weeks:  1. Pt will ambulate without AD demonstrating symmetrical step length and WB for improved functional mobility  2. Pt will demo improved glute strength evident by ability to perform 10 reps full range supine bridge to improve ease of transfers  3. Improve L hip extension AROM to at least 5 deg to improve gait symmetry  4. Improve FOTO score to >/= 75/100 to indicate improved function    Frequency / Duration:   Patient to be seen  2  times per week for 4  weeks: All LTG as above will be assessed and updated every 10 visits or 30 days and progressed as needed    Patient / Caregiver education and instruction: exercises  Therapist Signature: Patrick Winters PT Date: 4/96/1872   Certification Period: na Time: 8:39 AM   ===========================================================================================  I certify that the above Physical Therapy Services are being furnished while the patient is under my care. I agree with the treatment plan and certify that this therapy is necessary.     Physician Signature:        Date: Time:                         Bebeto Proctor MD  Please sign and return to [facility] UNIT or you may fax the signed copy to [phone]. Thank you.

## 2022-05-19 NOTE — PROGRESS NOTES
PHYSICAL THERAPY - DAILY TREATMENT NOTE    Patient Name: Tino Serrano        Date: 2022  : 2000   yes Patient  Verified  Visit #:     Insurance: Payor: Timothy Alexandra / Plan: [facility] PPO / Product Type: PPO /      In time: 8:37 Out time: 9:13   Total Treatment Time: 36     Medicare/BCBS Time Tracking (below)   Total Timed Codes (min):  20 1:1 Treatment Time:  36     TREATMENT AREA =  Pain in right hip [M25.551]    SUBJECTIVE  Pain Level (on 0 to 10 scale):  3  / 10   Medication Changes/New allergies or changes in medical history, any new surgeries or procedures?    no  If yes, update Summary List   Subjective Functional Status/Changes:  []  No changes reported     See POC          OBJECTIVE  15 min Therapeutic Exercise:  [x]  See flow sheet   Rationale:      increase ROM and increase strength to improve the patients ability to ambulate, transfer     5 min Gait Training: crutch hand  adjustment; stair training 4 steps w/ 1 HR, 1 crutch NR pattern, verbal cueing and visual demo required   Rationale: To improve ambulation safety and efficiency in order to improve patient's ability to safely ambulate at home for self care.     Billed With/As:   [x] TE   [] TA   [] Neuro   [] Self Care Patient Education: [x] Review HEP    [] Progressed/Changed HEP based on:   [x] positioning   [] body mechanics   [] transfers   [] heat/ice application    [x] other: post op restrictions/precautions, red/yellow flags     Other Objective/Functional Measures:    See POC     Post Treatment Pain Level (on 0 to 10) scale:   3  / 10     ASSESSMENT  Assessment/Changes in Function:     See POC     []  See Progress Note/Recertification   Patient will continue to benefit from skilled PT services to modify and progress therapeutic interventions, address functional mobility deficits, address ROM deficits, address strength deficits, analyze and address soft tissue restrictions, analyze and cue movement patterns, analyze and modify body mechanics/ergonomics, assess and modify postural abnormalities, address imbalance/dizziness and instruct in home and community integration to attain remaining goals.    Progress toward goals / Updated goals:    See POC     PLAN  []  Upgrade activities as tolerated yes Continue plan of care   []  Discharge due to :    []  Other:      Therapist: Ronit Block PT    Date: 5/19/2022 Time: 12:41 PM     Future Appointments   Date Time Provider Michelle Luna   5/24/2022  1:30 PM Abhi Sales [facility]   5/26/2022  2:45 PM Cat Zoila [facility]   5/31/2022 11:30 AM Marshall WATSON, PT [facility]   6/2/2022  1:30 PM Marshall WATSON, PT [facility]   6/7/2022  9:00 AM Yamil Love, PT [facility]   6/9/2022  9:30 AM Yamil Love, PT [facility]   6/14/2022  9:45 AM Yamil Love, PT [facility]   6/16/2022  9:30 AM Yamil Love, PT [facility]   6/21/2022  9:45 AM Yamil Love, PT [facility]   6/23/2022 11:00 AM Marshall WATSON, PT [facility]   6/28/2022  9:00 AM Yamil Love, PT [facility]   6/30/2022  9:30 AM Yamil Love, PT [facility]   7/5/2022  9:00 AM Yamil Love, PT [facility]   7/7/2022  9:30 AM Yamil Love, PT [facility]   7/12/2022  9:00 AM Yamil Love, PT [facility]   7/14/2022  9:30 AM Yamil Love, PT [facility]   7/19/2022  9:00 AM Yamil Love, PT [facility]   7/21/2022  9:15 AM Marshall WATSON, PT [facility]   7/26/2022  9:00 AM Marshall WATSON, PT [facility]   7/28/2022  9:15 AM Yenifer Burk, PT [facility]

## 2022-05-24 ENCOUNTER — HOSPITAL ENCOUNTER (OUTPATIENT)
Dept: PHYSICAL THERAPY | Age: 22
Discharge: HOME | End: 2022-05-24
Payer: COMMERCIAL

## 2022-05-24 PROCEDURE — 97140 MANUAL THERAPY 1/> REGIONS: CPT

## 2022-05-24 PROCEDURE — 97110 THERAPEUTIC EXERCISES: CPT

## 2022-05-24 NOTE — PROGRESS NOTES
PT DAILY TREATMENT NOTE     Patient Name: Verito Stone  Date:2022  : 2000  [x]  Patient  Verified  Payor: BLUE CROSS / Plan: [employer] [id number] PPO / Product Type: PPO /    In time:1:31  Out time:2:27  Total Treatment Time (min): 64  Visit #: 2 of 8    Medicare/BCBS Only   Total Timed Codes (min):  46 1:1 Treatment Time:  41       Treatment Area: Pain in right hip [M25.551]    SUBJECTIVE  Pain Level (0-10 scale): 0/10  Any medication changes, allergies to medications, adverse drug reactions, diagnosis change, or new procedure performed?: [x] No    [] Yes (see summary sheet for update)  Subjective functional status/changes:   [] No changes reported  Pt reports she has been doing well, she is not having any pain in her hip today. She has been working on her HEP.      OBJECTIVE    Modality rationale: decrease inflammation and decrease pain to improve the patients ability to ambulate   Min Type Additional Details    [] Estim:  []Unatt       []IFC  []Premod                        []Other:  []w/ice   []w/heat  Position:  Location:    [] Estim: []Att    []TENS instruct  []NMES                    []Other:  []w/US   []w/ice   []w/heat  Position:  Location:    []  Traction: [] Cervical       []Lumbar                       [] Prone          []Supine                       []Intermittent   []Continuous Lbs:  [] before manual  [] after manual    []  Ultrasound: []Continuous   [] Pulsed                           []1MHz   []3MHz W/cm2:  Location:    []  Iontophoresis with dexamethasone         Location: [] Take home patch   [] In clinic   10 [x]  Ice     []  heat  []  Ice massage  []  Laser   []  Anodyne Position: supine   Location: R anterior hip    []  Laser with stim  []  Other:  Position:  Location:    []  Vasopneumatic Device    []  Right     []  Left  Pre-treatment girth:  Post-treatment girth:  Measured at (location):  Pressure:       [] lo [] med [] hi   Temperature: [] lo [] med [] hi   [x] Skin assessment post-treatment:  [x]intact []redness- no adverse reaction    []redness - adverse reaction:   36/ 31 1:1 min Therapeutic Exercise:  [] See flow sheet :- 5 min warm up   Rationale: increase ROM and increase strength to improve the patients ability to walk    10 min Manual Therapy:  Stm to quad, TFL, glute med. Adductors, rectus femoris In supine   The manual therapy interventions were performed at a separate and distinct time from the therapeutic activities interventions. Rationale: decrease pain and increase tissue extensibility to improve pt ability to tolerate ambulation. With   [] TE   [] TA   [] neuro   [] other: Patient Education: [x] Review HEP    [] Progressed/Changed HEP based on:   [] positioning   [] body mechanics   [] transfers   [] heat/ice application    [] other:      Other Objective/Functional Measures: program initiated today- 1st follow up     Pain Level (0-10 scale) post treatment: 0/10    ASSESSMENT/Changes in Function: Pt required vc and demo for all exercises to perform correctly. Pt reported no inc in sx with tx today. Pt an PT verbally reviewed current HEP with no questions for exercises. Pt was issued orange thera band for home use. Pt responded well to initial therapy session reporting no inc in sx with tx. Pt edu re signs of infection and to monitor incisions for any sign of infection and to seek medical attention should she experience any infection sx. Pt verbalized understanding. Patient will continue to benefit from skilled PT services to modify and progress therapeutic interventions, address functional mobility deficits, address ROM deficits, address strength deficits, analyze and address soft tissue restrictions, analyze and cue movement patterns, analyze and modify body mechanics/ergonomics, assess and modify postural abnormalities, address imbalance/dizziness and instruct in home and community integration to attain remaining goals.      []  See Plan of Care  []  See progress note/recertification  []  See Discharge Summary         Progress towards goals / Updated goals: · Short Term Goals: To be accomplished in  1  weeks:  1. Pt will be I and compliant with HEP for self management of sx Pt reports I and compliance with initial HEP 5/24/22 goal met  · Long Term Goals: To be accomplished in  4  weeks:  1. Pt will ambulate without AD demonstrating symmetrical step length and WB for improved functional mobility  2. Pt will demo improved glute strength evident by ability to perform 10 reps full range supine bridge to improve ease of transfers  3. Improve L hip extension AROM to at least 5 deg to improve gait symmetry  4.  Improve FOTO score to >/= 75/100 to indicate improved function       PLAN  []  Upgrade activities as tolerated     []  Continue plan of care  []  Update interventions per flow sheet       []  Discharge due to:_  []  Other:_      Anette Pnen 5/24/2022  9:35 AM    Future Appointments   Date Time Provider Michelle Luna   5/24/2022  1:30 PM Nancy Roe [facility]   5/26/2022  2:45 PM Marianne Momin, PT [facility]   5/31/2022 11:30 AM Van WATSON, PT [facility]   6/2/2022  1:30 PM Van WATSON, PT [facility]   6/7/2022  9:00 AM Beatriz Love, PT [facility]   6/9/2022  9:30 AM Beatriz Love, PT [facility]   6/14/2022  9:45 AM Beatriz Love, PT [facility]   6/16/2022  9:30 AM Beatriz Love, PT [facility]   6/21/2022  9:45 AM Beatriz Love, PT [facility]   6/23/2022 11:00 AM Vangie Collins, PT [facility]   6/28/2022  9:00 AM Beatriz Love, PT [facility]   6/30/2022  9:30 AM Beatriz Love, PT [facility]   7/5/2022  9:00 AM Beatriz Love, PT [facility]   7/7/2022  9:30 AM Beatriz Love, PT [facility]   7/12/2022  9:00 AM Beatriz Love, PT [facility]   7/14/2022  9:30 AM Gladys Love, PT [facility]   7/19/2022  9:00 AM Beatriz Love, PT [facility] 7/21/2022  9:15 AM Lit Mcwilliams., PT [facility]   7/26/2022  9:00 AM Lit Mcwilliams., PT [facility]   7/28/2022  9:15 AM Lit Mcwilliams., PT [facility]

## 2022-05-26 ENCOUNTER — HOSPITAL ENCOUNTER (OUTPATIENT)
Dept: PHYSICAL THERAPY | Age: 22
Discharge: HOME | End: 2022-05-26
Payer: COMMERCIAL

## 2022-05-26 PROCEDURE — 97140 MANUAL THERAPY 1/> REGIONS: CPT

## 2022-05-26 PROCEDURE — 97110 THERAPEUTIC EXERCISES: CPT

## 2022-05-26 PROCEDURE — 97112 NEUROMUSCULAR REEDUCATION: CPT

## 2022-05-26 NOTE — PROGRESS NOTES
PHYSICAL THERAPY - DAILY TREATMENT NOTE    Patient Name: Bryce Sun        Date: 2022  : 2000   yes Patient  Verified  Visit #:   3   of   8  Insurance: Payor: Nikos  / Plan: [facility] PPO / Product Type: PPO /      In time: 2:47 Out time: 3:48   Total Treatment Time: 61     Medicare/BCBS Time Tracking (below)   Total Timed Codes (min):  51 1:1 Treatment Time:  51     TREATMENT AREA =  Pain in right hip [M25.551]    SUBJECTIVE  Pain Level (on 0 to 10 scale):  0  / 10   Medication Changes/New allergies or changes in medical history, any new surgeries or procedures?    no  If yes, update Summary List   Subjective Functional Status/Changes:  []  No changes reported     Pt reports no pain/soreness after last visit. Reports only taking pain medication prior to going to bed. Is back to sleeping in bed instead of couch. OBJECTIVE  Modalities Rationale:     decrease inflammation and decrease pain to improve patient's ability to ambulate   min [] Estim, type/location:                                      []  att     []  unatt     []  w/US     []  w/ice    []  w/heat    min []  Mechanical Traction: type/lbs                   []  pro   []  sup   []  int   []  cont    []  before manual    []  after manual    min []  Ultrasound, settings/location:      min []  Iontophoresis w/ dexamethasone, location:                                               []  take home patch       []  in clinic   10 min [x]  Ice     []  Heat    location/position:  To the R hip in long sit    min []  Vasopneumatic Device, press/temp:    If using vaso (only need to measure limb vaso being performed on)      pre-treatment girth :       post-treatment girth :       measured at (landmark location) :    Vasopnuematic compression justification:  Per bilateral girth measures taken and listed above the edema is considered significant and having an impact on the patient's n/a    min []  Other:    [x] Skin assessment post-treatment (if applicable):    [x]  intact    []  redness- no adverse reaction                  []redness - adverse reaction:      31 min Therapeutic Exercise:  [x]  See flow sheet   Rationale:      increase ROM and increase strength to improve the patients ability to ambulate     10 min Manual Therapy: STM to R piriformis, glutes, RF; prone hip IR, quad str; supine hip abd, ER PROM, SL gentle QHF stretch   Rationale:      decrease pain, increase ROM, increase tissue extensibility and decrease trigger points to improve patient's ability to complete ADLs  The manual therapy interventions were performed at a separate and distinct time from the therapeutic activities interventions. 10 min Neuromuscular Re-ed: [x]  See flow sheet   Rationale:    increase strength, improve coordination and increase proprioception to improve the patients ability to transfer, ambulate    Billed With/As:   [x] TE   [] TA   [x] Neuro   [] Self Care Patient Education: [x] Review HEP    [] Progressed/Changed HEP based on:   [] positioning   [] body mechanics   [] transfers   [] heat/ice application    [] other:      Other Objective/Functional Measures: Added hurdles (non-recip w/ 1 crutch), lateral walk, TA draw, and bridge  Near symmetrical weight shift amb w/ 1 crutch, cont w/ dec hip ext R  TTP to R piriformis  Frequent fasciculations noted to abd PROM and SL QHF str  Progressed prone laying to BLAIR to improve hip ext     Post Treatment Pain Level (on 0 to 10) scale:   0  / 10     ASSESSMENT  Assessment/Changes in Function:     Pt progressing excellently within protocol guidelines. Cleared pt for amb w/ single crutch, returning to B crutch prn and for long distances.     []  See Progress Note/Recertification   Patient will continue to benefit from skilled PT services to modify and progress therapeutic interventions, address functional mobility deficits, address ROM deficits, address strength deficits, analyze and address soft tissue restrictions, analyze and cue movement patterns, analyze and modify body mechanics/ergonomics, assess and modify postural abnormalities and instruct in home and community integration to attain remaining goals. Progress toward goals / Updated goals: · Short Term Goals: To be accomplished in  1  weeks:  1. Pt will be I and compliant with HEP for self management of sx Pt reports I and compliance with initial HEP 5/24/22 goal met  · Long Term Goals: To be accomplished in  4  weeks:  1. Pt will ambulate without AD demonstrating symmetrical step length and WB for improved functional mobility 5/26/22: near symmetrical WB w/ single crutch, cont to lack R hip ext (goal in progress)   2. Pt will demo improved glute strength evident by ability to perform 10 reps full range supine bridge to improve ease of transfers  3. Improve L hip extension AROM to at least 5 deg to improve gait symmetry  4.  Improve FOTO score to >/= 75/100 to indicate improved function       PLAN  []  Upgrade activities as tolerated yes Continue plan of care   []  Discharge due to :    []  Other:      Therapist: Alee Horne PT    Date: 5/26/2022 Time: 10:48 AM     Future Appointments   Date Time Provider Michelle Luna   5/26/2022  2:45 PM Lovely House, PT [facility]   5/31/2022 11:30 AM Darius WATSON, PT [facility]   6/2/2022  1:30 PM Darius WATSON, PT [facility]   6/7/2022  9:00 AM Neftali Love, PT [facility]   6/9/2022  9:30 AM Neftali Love, PT [facility]   6/14/2022  9:45 AM Neftali Love, PT [facility]   6/16/2022  9:30 AM Neftali Love, PT [facility]   6/21/2022  9:45 AM Neftali Love, PT [facility]   6/23/2022 11:00 AM Mamadou Villagomez, PT [facility]   6/28/2022  9:00 AM Neftali Love, PT [facility]   6/30/2022  9:30 AM Neftali Love, PT [facility]   7/5/2022  9:00 AM Neftali Love, PT [facility]   7/7/2022  9:30 AM Neftali Love, PT [facility] 7/12/2022  9:00 AM Stew Love, PT [facility]   7/14/2022  9:30 AM Stew Love, PT [facility]   7/19/2022  9:00 AM Stew Love, PT [facility]   7/21/2022  9:15 AM Kevin WATSON, PT [facility]   7/26/2022  9:00 AM Kevin WATSON, PT [facility]   7/28/2022  9:15 AM Keith Munson, PT [facility]

## 2022-05-31 ENCOUNTER — HOSPITAL ENCOUNTER (OUTPATIENT)
Dept: PHYSICAL THERAPY | Age: 22
Discharge: HOME | End: 2022-05-31
Payer: COMMERCIAL

## 2022-05-31 PROCEDURE — 97140 MANUAL THERAPY 1/> REGIONS: CPT

## 2022-05-31 PROCEDURE — 97110 THERAPEUTIC EXERCISES: CPT

## 2022-05-31 PROCEDURE — 97112 NEUROMUSCULAR REEDUCATION: CPT

## 2022-05-31 NOTE — PROGRESS NOTES
PHYSICAL THERAPY - DAILY TREATMENT NOTE    Patient Name: Sharin Primrose        Date: 2022  : 2000   yes Patient  Verified  Visit #:   4   of   8  Insurance: Payor: BLUE CROSS / Plan: [facility] PPO / Product Type: PPO /      In time: 2:10 Out time: 300   Total Treatment Time: 50     Medicare/BCBS Time Tracking (below)   Total Timed Codes (min):  50 1:1 Treatment Time:  50     TREATMENT AREA =  Pain in right hip [M25.551]    SUBJECTIVE  Pain Level (on 0 to 10 scale):  0  / 10   Medication Changes/New allergies or changes in medical history, any new surgeries or procedures?    no  If yes, update Summary List   Subjective Functional Status/Changes:  []  No changes reported     Pt reports she had no inc pain after her last session, she sees her MD on Thursday. OBJECTIVE  Modalities Rationale:     decrease inflammation and decrease pain to improve patient's ability to ambulate   min [] Estim, type/location:                                      []  att     []  unatt     []  w/US     []  w/ice    []  w/heat    min []  Mechanical Traction: type/lbs                   []  pro   []  sup   []  int   []  cont    []  before manual    []  after manual    min []  Ultrasound, settings/location:      min []  Iontophoresis w/ dexamethasone, location:                                               []  take home patch       []  in clinic   PD min [x]  Ice     []  Heat    location/position:  To the R hip in long sit    min []  Vasopneumatic Device, press/temp:    If using vaso (only need to measure limb vaso being performed on)      pre-treatment girth :       post-treatment girth :       measured at (landmark location) :    Vasopnuematic compression justification:  Per bilateral girth measures taken and listed above the edema is considered significant and having an impact on the patient's n/a    min []  Other:    [x] Skin assessment post-treatment (if applicable):    [x]  intact    []  redness- no adverse reaction                  []redness - adverse reaction:      30 min Therapeutic Exercise:  [x]  See flow sheet   Rationale:      increase ROM and increase strength to improve the patients ability to ambulate     10 min Manual Therapy:  SL gentle QHF stretch, Prone: STM to R piriformis, glutes, prone hip IR, quad str; supine hip abd, ER PROM,   Rationale:      decrease pain, increase ROM, increase tissue extensibility and decrease trigger points to improve patient's ability to complete ADLs  The manual therapy interventions were performed at a separate and distinct time from the therapeutic activities interventions. 10 min Neuromuscular Re-ed: [x]  See flow sheet   Rationale:    increase strength, improve coordination and increase proprioception to improve the patients ability to transfer, ambulate    Billed With/As:   [x] TE   [] TA   [x] Neuro   [] Self Care Patient Education: [x] Review HEP    [] Progressed/Changed HEP based on:   [] positioning   [] body mechanics   [] transfers   [] heat/ice application    [] other:      Other Objective/Functional Measures: Added AROM prone IR/ER, prone quad set, prone hip flexor iso     Post Treatment Pain Level (on 0 to 10) scale:   0  / 10     ASSESSMENT  Assessment/Changes in Function:   Pt was able to tolerate progressions without complaint today, Pt required vc and demo with all new exercises to perform correctly. Pt reported no inc in sx with any new exercise today, pt was instructed to modify ROM with all new exercises to tolerance. Pt edu re utilizing myrna heel slides to initiate strengthening at hip without aggravating hip flexor tendon. Pt verbalized understanding.       []  See Progress Note/Recertification   Patient will continue to benefit from skilled PT services to modify and progress therapeutic interventions, address functional mobility deficits, address ROM deficits, address strength deficits, analyze and address soft tissue restrictions, analyze and cue movement patterns, analyze and modify body mechanics/ergonomics, assess and modify postural abnormalities and instruct in home and community integration to attain remaining goals. Progress toward goals / Updated goals: · Short Term Goals: To be accomplished in  1  weeks:  1. Pt will be I and compliant with HEP for self management of sx Pt reports I and compliance with initial HEP 5/24/22 goal met  · Long Term Goals: To be accomplished in  4  weeks:  1. Pt will ambulate without AD demonstrating symmetrical step length and WB for improved functional mobility 5/26/22: near symmetrical WB w/ single crutch, cont to lack R hip ext (goal in progress)   2. Pt will demo improved glute strength evident by ability to perform 10 reps full range supine bridge to improve ease of transfers addressing with bridges 5/31/22  3. Improve L hip extension AROM to at least 5 deg to improve gait symmetry  4.  Improve FOTO score to >/= 75/100 to indicate improved function       PLAN  []  Upgrade activities as tolerated yes Continue plan of care   []  Discharge due to :    []  Other:      Therapist: Khang Shipley    Date: 5/31/2022 Time: 10:48 AM     Future Appointments   Date Time Provider Michelle Luna   5/31/2022  2:15 PM Kasey Sales [facility]   6/2/2022  1:30 PM Tamika Kurtz., PT [facility]   6/7/2022  3:00 PM Cedric Camacho [address] [place] [address] [facility]   6/9/2022  3:00 PM Tamika Kurtz., PT [facility]   6/14/2022  3:00 PM Judith Hendrickson [facility]   6/16/2022  3:00 PM Tamika Kurtz., PT [facility]   6/21/2022  3:45 PM Judith Hendrickson [facility]   6/23/2022  3:00 PM Tamika Kurtz., PT [facility]   6/28/2022  3:00 PM Judith Hendrickson [facility]   6/30/2022  3:00 PM Tamika Kurtz., PT [facility]   7/5/2022  3:00 PM Tamika Aguirret., PT [facility]   7/7/2022  3:00 PM Tamika Kurtz., PT [facility]   7/12/2022  3:00 PM Cedric Camacho [facility]   7/14/2022  3:15 PM [facility] PT SUKH PEÑA 2 [facility] 7/19/2022  3:45 PM Cleopatra Marquez [facility]   7/21/2022  3:00 PM Russell Clark., PT [facility]   7/26/2022  3:00 PM Russell Clark., PT [facility]   7/28/2022  3:00 PM Russell Clark., PT [facility]

## 2022-06-02 ENCOUNTER — HOSPITAL ENCOUNTER (OUTPATIENT)
Dept: PHYSICAL THERAPY | Age: 22
Discharge: HOME | End: 2022-06-02
Payer: COMMERCIAL

## 2022-06-02 PROCEDURE — 97110 THERAPEUTIC EXERCISES: CPT

## 2022-06-02 PROCEDURE — 97140 MANUAL THERAPY 1/> REGIONS: CPT

## 2022-06-02 PROCEDURE — 97116 GAIT TRAINING THERAPY: CPT

## 2022-06-02 NOTE — PROGRESS NOTES
PHYSICAL THERAPY - DAILY TREATMENT NOTE    Patient Name: Sharin Primrose        Date: 2022  : 2000   yes Patient  Verified  Visit #:   5   of   8  Insurance: Payor: Tina Romero / Plan: [facility] PPO / Product Type: PPO /      In time: 1:28 Out time: 2:32   Total Treatment Time: 64     Medicare/BCBS Time Tracking (below)   Total Timed Codes (min): 54 1:1 Treatment Time:  44     TREATMENT AREA =  Pain in right hip [M25.551]    SUBJECTIVE  Pain Level (on 0 to 10 scale):  0  / 10   Medication Changes/New allergies or changes in medical history, any new surgeries or procedures?    no  If yes, update Summary List   Subjective Functional Status/Changes:  []  No changes reported     Pt states she has no pain with standing/walking; using her L axillary crutch. Saw MD today; sutures out; he said everything looks good and can d/c use of crutches when PT says OK. .     OBJECTIVE  Modalities Rationale:     decrease inflammation and decrease pain to improve patient's ability to ambulate   min [] Estim, type/location:                                      []  att     []  unatt     []  w/US     []  w/ice    []  w/heat    min []  Mechanical Traction: type/lbs                   []  pro   []  sup   []  int   []  cont    []  before manual    []  after manual    min []  Ultrasound, settings/location:      min []  Iontophoresis w/ dexamethasone, location:                                               []  take home patch       []  in clinic   10 min [x]  Ice     []  Heat    location/position:  To the R hip in long sit    min []  Vasopneumatic Device, press/temp:    If using vaso (only need to measure limb vaso being performed on)      pre-treatment girth :       post-treatment girth :       measured at (landmark location) :    Vasopnuematic compression justification:  Per bilateral girth measures taken and listed above the edema is considered significant and having an impact on the patient's n/a    min [] Other:    [x] Skin assessment post-treatment (if applicable):    [x]  intact    []  redness- no adverse reaction                  []redness - adverse reaction:      20 1:1  (30) min Therapeutic Exercise:  [x]  See flow sheet   Rationale:      increase ROM and increase strength to improve the patients ability to ambulate     10  1:1 min Manual Therapy:  SL gentle QHF stretch, Prone: STM to R piriformis, glutes, prone hip IR, quad str; supine hip abd, ER PROM   Rationale:      decrease pain, increase ROM, increase tissue extensibility and decrease trigger points to improve patient's ability to complete ADLs  The manual therapy interventions were performed at a separate and distinct time from the therapeutic activities interventions. 14 min Gait Training:    -retro walk x 1 lap with counter support  -side steps x1 lap with counter support  -hurdles, 6 sm, L crutch, NR x1 RLE leading, LLE leading; Reciprocal light 2 finger support x 2 laps  -hurdles lateral x 1 lap  -SLS   Rationale: To increase safety and independence with ambulation with least restrictive AD and decreased fall risk. Billed With/As:   [x] TE   [] TA   [x] Neuro   [] Self Care Patient Education: [x] Review HEP    [] Progressed/Changed HEP based on:   [] positioning   [] body mechanics   [] transfers   [] heat/ice application    [x] other: pt advised ok to d/c use of crutch while ambulating in home; advised to continue with crutch in community.  Pt ed to return to crutch if increase in pain/swelling  -updated HEP (see chart copy)     Other Objective/Functional Measures:  -advanced wt shifts to SLS   -added standing BAPS level 1  -advanced TG squats to TRX squats to 90 deg  -advanced bridge to hip thrusts on workout bench (pt reported no significant challenge with bridge)     Post Treatment Pain Level (on 0 to 10) scale:   0  / 10     ASSESSMENT  Assessment/Changes in Function:   Pt demonstrated good SLS proprioceptive control and strength for SLS and SLS BAPS today indicating appropriate to wean off crutch for short household distances. She continues to have glute weakness as evidenced by limited AROM with bridges, however she tolerated advancements without adverse sx's.      []  See Progress Note/Recertification   Patient will continue to benefit from skilled PT services to modify and progress therapeutic interventions, address functional mobility deficits, address ROM deficits, address strength deficits, analyze and address soft tissue restrictions, analyze and cue movement patterns, analyze and modify body mechanics/ergonomics, assess and modify postural abnormalities and instruct in home and community integration to attain remaining goals. Progress toward goals / Updated goals: · Short Term Goals: To be accomplished in  1  weeks:  1. Pt will be I and compliant with HEP for self management of sx Pt reports I and compliance with initial HEP 5/24/22 goal met  · Long Term Goals: To be accomplished in  4  weeks:  1. Pt will ambulate without AD demonstrating symmetrical step length and WB for improved functional mobility 5/26/22: near symmetrical WB w/ single crutch, cont to lack R hip ext (goal in progress)   2. Pt will demo improved glute strength evident by ability to perform 10 reps full range supine bridge to improve ease of transfers addressing with bridges 5/31/22  -6/2: goal in progress; bridge to 80-90% x 10 reps. 3. Improve L hip extension AROM to at least 5 deg to improve gait symmetry  4. Improve FOTO score to >/= 75/100 to indicate improved function       PLAN  []  Upgrade activities as tolerated yes Continue plan of care   []  Discharge due to :    []  Other:      Therapist: Kait Briones.  Dee Dee Stevens, PT    Date: 6/2/2022 Time: 3:12 PM      Future Appointments   Date Time Provider Michelle Luna   6/2/2022  1:30 PM Shaunna Castaneda., PT [facility]   6/7/2022  3:00 PM Sergio Ortiz [facility]   6/9/2022  3:00 PM Petros Roman D., PT [facility]   6/14/2022  3:00 PM Rosy Nash [facility]   6/16/2022  3:00 PM Bhavna Cotton., PT [facility]   6/21/2022  3:45 PM Caorlyn Relic [facility]   6/23/2022  3:00 PM Bhavna Cotton., PT [facility]   6/28/2022  3:00 PM Rosy Conchisselina [facility]   6/30/2022  3:00 PM Bhavna Cotton., PT [facility]   7/5/2022  3:00 PM Bhavna Cotton., PT [facility]   7/7/2022  3:00 PM Bhavna Cotton., PT [facility]   7/12/2022  3:00 PM Carolyn Relic [facility]   7/14/2022  3:15 PM [facility] PT CHILLED POND 2 [facility]   7/19/2022  3:45 PM Carolyn Relic [facility]   7/21/2022  3:00 PM Bhavna Cotton., PT [facility]   7/26/2022  3:00 PM Bhavna Cotton., PT [facility]   7/28/2022  3:00 PM Bhavna Cotton., PT [facility]

## 2022-06-07 ENCOUNTER — HOSPITAL ENCOUNTER (OUTPATIENT)
Dept: PHYSICAL THERAPY | Age: 22
Discharge: HOME | End: 2022-06-07
Payer: COMMERCIAL

## 2022-06-07 PROCEDURE — 97110 THERAPEUTIC EXERCISES: CPT

## 2022-06-07 PROCEDURE — 97140 MANUAL THERAPY 1/> REGIONS: CPT

## 2022-06-07 NOTE — PROGRESS NOTES
PHYSICAL THERAPY - DAILY TREATMENT NOTE    Patient Name: Ana Paula Cano        Date: 2022  : 2000   yes Patient  Verified  Visit #:   6   of   8  Insurance: Payor: Stefania Hamer / Plan: [employer] [facility] [id number] PPO / Product Type: PPO /      In time: 2:55 Out time: 3:56   Total Treatment Time: 61     Medicare/BCBS Time Tracking (below)   Total Timed Codes (min): 51 1:1 Treatment Time:  51     TREATMENT AREA =  Pain in right hip [M25.551]    SUBJECTIVE  Pain Level (on 0 to 10 scale): 4/ 10   Medication Changes/New allergies or changes in medical history, any new surgeries or procedures?    no  If yes, update Summary List   Subjective Functional Status/Changes:  []  No changes reported     Pt states she has been having some hip pain today at her hip ( points to SI joint and anterior hip)  . OBJECTIVE  Modalities Rationale:     decrease inflammation and decrease pain to improve patient's ability to ambulate   min [] Estim, type/location:                                      []  att     []  unatt     []  w/US     []  w/ice    []  w/heat    min []  Mechanical Traction: type/lbs                   []  pro   []  sup   []  int   []  cont    []  before manual    []  after manual    min []  Ultrasound, settings/location:      min []  Iontophoresis w/ dexamethasone, location:                                               []  take home patch       []  in clinic   10 min [x]  Ice     []  Heat    location/position:  To the R hip and sacrum in supine    min []  Vasopneumatic Device, press/temp:    If using vaso (only need to measure limb vaso being performed on)      pre-treatment girth :       post-treatment girth :       measured at (landmark location) :    Vasopnuematic compression justification:  Per bilateral girth measures taken and listed above the edema is considered significant and having an impact on the patient's n/a    min []  Other:    [x] Skin assessment post-treatment (if applicable):    [x]  intact []  redness- no adverse reaction                  []redness - adverse reaction:      31   (1:1 36) min Therapeutic Exercise:  [x]  See flow sheet   Rationale:      increase ROM and increase strength to improve the patients ability to ambulate     15  1:1 min Manual Therapy: Manual correction of L anterior/R posterior pelvic rotation. Prone: STM to R piriformis, glutes, prone hip IR, quad str; supine hip abd, ER PROM, Supine: stm to hip flexor, scar tissue, proximal quad   Rationale:      decrease pain, increase ROM, increase tissue extensibility and decrease trigger points to improve patient's ability to complete ADLs  The manual therapy interventions were performed at a separate and distinct time from the therapeutic activities interventions. x min Gait Training:    -retro walk x 1 lap with counter support  -side steps x1 lap with counter support  -hurdles, 6 sm, L crutch, NR x1 RLE leading, LLE leading; Reciprocal light 2 finger support x 2 laps  -hurdles lateral x 1 lap  -SLS   Rationale: To increase safety and independence with ambulation with least restrictive AD and decreased fall risk. Billed With/As:   [x] TE   [] TA   [x] Neuro   [] Self Care Patient Education: [x] Review HEP    [] Progressed/Changed HEP based on:   [] positioning   [] body mechanics   [] transfers   [] heat/ice application    [x] other: pt advised ok to d/c use of crutch while ambulating in home; advised to continue with crutch in community. Pt ed to return to crutch if increase in pain/swelling  -updated HEP (see chart copy)     Other Objective/Functional Measures:  SAQ performed bilaterally today   Added self SI mobilization  Added abdominal isometric      Post Treatment Pain Level (on 0 to 10) scale:   0  / 10     ASSESSMENT  Assessment/Changes in Function:   Pt was instructed in self SI mobilization to improve pt ability to self manage sx. See chart for copy of hand out. Pt edu re muscle energy techniques.  Pt verbalized understanding. Pt was also instructed in abdominal isometrics with intention of improving core stability and strength for improved pelvic dynamic stability for improved ability to ambulate. SAQ was performed bilaterally ( alternating) today to avoid re irritating SI joint. []  See Progress Note/Recertification   Patient will continue to benefit from skilled PT services to modify and progress therapeutic interventions, address functional mobility deficits, address ROM deficits, address strength deficits, analyze and address soft tissue restrictions, analyze and cue movement patterns, analyze and modify body mechanics/ergonomics, assess and modify postural abnormalities and instruct in home and community integration to attain remaining goals. Progress toward goals / Updated goals: · Short Term Goals: To be accomplished in  1  weeks:  1. Pt will be I and compliant with HEP for self management of sx Pt reports I and compliance with initial HEP 5/24/22 goal met  · Long Term Goals: To be accomplished in  4  weeks:  1. Pt will ambulate without AD demonstrating symmetrical step length and WB for improved functional mobility 5/26/22: near symmetrical WB w/ single crutch, cont to lack R hip ext (goal in progress)   2. Pt will demo improved glute strength evident by ability to perform 10 reps full range supine bridge to improve ease of transfers addressing with bridges 5/31/22  -6/2: goal in progress; bridge to 80-90% x 10 reps. 3. Improve L hip extension AROM to at least 5 deg to improve gait symmetry  4.  Improve FOTO score to >/= 75/100 to indicate improved function       PLAN  []  Upgrade activities as tolerated yes Continue plan of care   []  Discharge due to :    []  Other:      Therapist: Floydene Shone    Date: 6/7/2022 Time: 3:12 PM      Future Appointments   Date Time Provider Michelle Luna   6/7/2022  3:00 PM Howard Vargas [facility]   6/9/2022  3:00 PM Mamadou Villagomez, PT [facility]   6/14/2022  3:00 PM Jeannette Landrum [facility]   6/16/2022  3:00 PM Mamadou Salts., PT [facility]   6/21/2022  3:45 PM Jeannette Landrum [facility]   6/23/2022  3:00 PM Mamadou Salts., PT [facility]   6/28/2022  3:00 PM Howard Vargas [facility]   6/30/2022  3:00 PM Mamadou Salts., PT [facility]   7/5/2022  3:00 PM Mamadou Salts., PT [facility]   7/7/2022  3:00 PM Mamadou Salts., PT [facility]   7/12/2022  3:00 PM Jeannette Landrum [facility]   7/14/2022  3:15 PM [facility] PT CHILLED POND 2 [facility]   7/19/2022  3:45 PM Jeannette Landrum [facility]   7/21/2022  3:00 PM Mamadou Salts., PT [facility]   7/26/2022  3:00 PM Mamadou Salts., PT [facility]   7/28/2022  3:00 PM Mamadou Salts., PT [facility]

## 2022-06-09 ENCOUNTER — HOSPITAL ENCOUNTER (OUTPATIENT)
Dept: PHYSICAL THERAPY | Age: 22
Discharge: HOME | End: 2022-06-09
Payer: COMMERCIAL

## 2022-06-09 PROCEDURE — 97140 MANUAL THERAPY 1/> REGIONS: CPT

## 2022-06-09 PROCEDURE — 97110 THERAPEUTIC EXERCISES: CPT

## 2022-06-09 NOTE — PROGRESS NOTES
PHYSICAL THERAPY - DAILY TREATMENT NOTE    Patient Name: Antonieta York        Date: 2022  : 2000   yes Patient  Verified  Visit #:   7   of   8  Insurance: Payor: Olivia Lopez / Plan: [facility] [id number] PPO / Product Type: PPO /      In time: 3:06 PM Out time: 3:53   Total Treatment Time: 47     Medicare/BCBS Time Tracking (below)   Total Timed Codes (min):47 1:1 Treatment Time:  47     TREATMENT AREA =  Pain in right hip [M25.551]    SUBJECTIVE  Pain Level (on 0 to 10 scale): 0 / 10   Medication Changes/New allergies or changes in medical history, any new surgeries or procedures?    no  If yes, update Summary List   Subjective Functional Status/Changes:  []  No changes reported     Pt states her hip doesn't hurt today (as at last session)       OBJECTIVE  Modalities Rationale:     decrease inflammation and decrease pain to improve patient's ability to ambulate   min [] Estim, type/location:                                      []  att     []  unatt     []  w/US     []  w/ice    []  w/heat    min []  Mechanical Traction: type/lbs                   []  pro   []  sup   []  int   []  cont    []  before manual    []  after manual    min []  Ultrasound, settings/location:      min []  Iontophoresis w/ dexamethasone, location:                                               []  take home patch       []  in clinic   PD min [x]  Ice     []  Heat    location/position:  To the R hip and sacrum in supine    min []  Vasopneumatic Device, press/temp:    If using vaso (only need to measure limb vaso being performed on)      pre-treatment girth :       post-treatment girth :       measured at (landmark location) :    Vasopnuematic compression justification:  Per bilateral girth measures taken and listed above the edema is considered significant and having an impact on the patient's n/a    min []  Other:    [x] Skin assessment post-treatment (if applicable):    [x]  intact    []  redness- no adverse reaction []redness - adverse reaction:      37   1:1 min Therapeutic Exercise:  [x]  See flow sheet   Rationale:      increase ROM and increase strength to improve the patients ability to ambulate     10  1:1 min Manual Therapy: Supine hip abd, ER PROM, Supine: stm to hip flexor, scar tissue, proximal quad   Rationale:      decrease pain, increase ROM, increase tissue extensibility and decrease trigger points to improve patient's ability to complete ADLs  The manual therapy interventions were performed at a separate and distinct time from the therapeutic activities interventions. Billed With/As:   [x] TE   [] TA   [x] Neuro   [] Self Care Patient Education: [x] Review HEP    [] Progressed/Changed HEP based on:   [] positioning   [] body mechanics   [] transfers   [] heat/ice application    [x] other: pt advised ok to d/c use of crutch in community as tolerated. Other Objective/Functional Measures:  R SLS 30\" no pain    -added TRX: hip add sliders, static lateral lunges   -added s/l hip abd, hip hikes     Post Treatment Pain Level (on 0 to 10) scale:   0  / 10     ASSESSMENT  Assessment/Changes in Function:   Pt able to tolerate progression of exercises without adverse sx's. She did have significant difficulty with hip adduction sliders and cued to decreased wb'ing on RLE to tolerance with improved tolerance when only ~30-50% wb'ing RLE.  Pt with moderate soft tissue congestion to mm's of proximal anterior hip, as well as min adhesion noted to scar; addressed with manual. Progressing well within protocol guidelines     []  See Progress Note/Recertification   Patient will continue to benefit from skilled PT services to modify and progress therapeutic interventions, address functional mobility deficits, address ROM deficits, address strength deficits, analyze and address soft tissue restrictions, analyze and cue movement patterns, analyze and modify body mechanics/ergonomics, assess and modify postural abnormalities and instruct in home and community integration to attain remaining goals. Progress toward goals / Updated goals: · Short Term Goals: To be accomplished in  1  weeks:  1. Pt will be I and compliant with HEP for self management of sx Pt reports I and compliance with initial HEP 5/24/22 goal met  · Long Term Goals: To be accomplished in  4  weeks:  1. Pt will ambulate without AD demonstrating symmetrical step length and WB for improved functional mobility 5/26/22: near symmetrical WB w/ single crutch, cont to lack R hip ext (goal in progress)   -6/9: goal met; pt ambulating without AD with symmetrical steps and wb'ing no pain  2. Pt will demo improved glute strength evident by ability to perform 10 reps full range supine bridge to improve ease of transfers addressing with bridges 5/31/22  -6/2: goal in progress; bridge to 80-90% x 10 reps. 3. Improve L hip extension AROM to at least 5 deg to improve gait symmetry  4. Improve FOTO score to >/= 75/100 to indicate improved function       PLAN  []  Upgrade activities as tolerated yes Continue plan of care   []  Discharge due to :    []  Other:      Therapist: Rip Frankel.  Zenia PT    Date: 6/9/2022 Time: 6:39 PM      Future Appointments   Date Time Provider Michelle Luna   6/9/2022  3:00 PM Matt Angeles, PT [facility]   6/14/2022  3:00 PM Jeredkym Gaytan [facility]   6/16/2022  3:00 PM Matt Angeles, PT [facility]   6/21/2022  3:45 PM Chrissy Jeff [facility]   6/23/2022  3:00 PM Matt Angeles, PT [facility]   6/28/2022  3:00 PM Jonna  [facility]   6/30/2022  3:00 PM Matt Angeles, PT [facility]   7/5/2022  3:00 PM Matt Thorne., PT [facility]   7/7/2022  3:00 PM Matt Thorne., PT [facility]   7/12/2022  3:00 PM Chrissy Jeff [facility]   7/14/2022  3:15 PM [facility] PT CHILLED POND 2 [facility]   7/19/2022  3:45 PM Chrissy Jeff [facility]   7/21/2022  3:00 PM Matt Thorne., PT [facility] 7/26/2022  3:00 PM Juan Reese., PT [facility]   7/28/2022  3:00 PM Juan Reese., PT [facility]

## 2022-06-14 ENCOUNTER — HOSPITAL ENCOUNTER (OUTPATIENT)
Dept: PHYSICAL THERAPY | Age: 22
End: 2022-06-14
Payer: COMMERCIAL

## 2022-06-16 ENCOUNTER — HOSPITAL ENCOUNTER (OUTPATIENT)
Dept: PHYSICAL THERAPY | Age: 22
Discharge: HOME | End: 2022-06-16
Payer: COMMERCIAL

## 2022-06-16 PROCEDURE — 97110 THERAPEUTIC EXERCISES: CPT

## 2022-06-16 NOTE — PROGRESS NOTES
[address] [place] [address] PHYSICAL THERAPY  Dave Fang [address], [place], [address] - Phone: [phone]  Fax: [phone]  PROGRESS NOTE  Patient Name: Baljeet Yee : 2000   Treatment/Medical Diagnosis: Pain in right hip [M25.551]   Referral Source: Yohannes Villareal MD     Date of Initial Visit: 22 Attended Visits: 8 Missed Visits: 1     SUMMARY OF TREATMENT  Physical therapy has consisted of therapeutic exercises for increased LE strength, ROM, and flexibility. Manual therapy for decreased muscle hypertonicity and increased ROM/flexibility. Cold pack provided PRN for palliative. Pt education provided regarding HEP, wb'ing restrictions, activity restrictions and post-op hip precautions. CURRENT STATUS  Pt reports 60% overall improvement in sx since beginning care. Pt reports 4/10 max pain, \"when I move it wrong (into IR)\"; localized primarily to anterior/medial hip/groin regoin; 0/10 avg pain. Improvements:  walking (in home and through grocery store), stairs, sleeping, driving. Good improvement with hip ROM to all planes. Good improvement in R hip strength all planes as per objective measurements below. Remaining functional limitations: getting up from low surfaces, walking while carrying items (especially heavy items), running, jumping,      Objective Measurements:  FUNCTIONAL ASSESSMENT: Ambulates without AD, normalized gait mechanics and symmetrical step lengths   Squat to 84 deg with symmetrical wb'ing no pain   Lateral tap down with hemipelvic drop off 6 inch step height  STRENGTH: Hip flex 4+/5, Ext 4/5, Abd 5/5, Knee ext/flex 5/5. Modified lateral plank R 52\", L 58\" (bolster under axilla 2/2 c/o chronic shoulder pain/weakness)  ROM: Hip (in prone) IR 55, ER 44, Ext 12 deg; Abd 40 PROM, Flex 110 AROM    FOTO 63/100    Goal/Measure of Progress Goal Met? 1. Pt will be I and compliant with HEP for self management of sx.    Status at last Eval: Ed at [facility] Current Status: Pt compliant; HEP advanced today yes   2. Pt will ambulate without AD demonstrating symmetrical step length and WB for improved functional mobility   Status at last Eval: WBAT (~25% WB) R LE with B axillary crutch, dec step length R Current Status: 6/9: Amb w/o AD, symmetrical steps and wb'ing no pain yes   3. Pt will demo improved glute strength evident by ability to perform 10 reps full range supine bridge to improve ease of transfers    Status at last Eval: 25% AROM Current Status: 100% x 10 reps; total 20 reps % yes     4. Improve L hip extension AROM to at least 5 deg to improve gait symmetry   Status at last Eval: -5 Current Status: 12 yes   5. Improve FOTO score to >/= 75/100 to indicate improved function   Status at last Eval: 23 Current Status: 63 progressing     New Goals to be achieved in __4__  weeks:  1. Pt will increase R hip flexion strength to 5/5 for improved tolerance with walking while carrying items. (6/16: 4+/5)  2. Pt will demo good form/control with 6 inch lateral tap downs to promote ability to resume light jogging/walking intervals. (6/16: hemipelvic drop from 6 inch step)  3. Pt will increase R glute max strength to 5/5 for improved ability to get up from low surfaces. (6/16: 4/5)  4. Improve FOTO score to >/= 75/100 to indicate improved function    RECOMMENDATIONS  Pt will benefit from skilled progression of PT at 2 x/wk for 4 weeks to attain LTG's and promote ability to resume normal activities. If you have any questions/comments please contact us directly at [phone]. Thank you for allowing us to assist in the care of your patient. Therapist Signature: Kina Knutson PT Date: 6/16/2022     Time: 3:18 PM   NOTE TO PHYSICIAN:  PLEASE COMPLETE THE ORDERS BELOW AND FAX TO   [facility]: [phone]  If you are unable to process this request in 24 hours please contact our office: [phone]    ___ I have read the above report and request that my patient continue as recommended.   ___ I have read the above report and request that my patient continue therapy with the following changes/special instructions:_________________________________________________________   ___ I have read the above report and request that my patient be discharged from therapy.      Physician Signature:        Date:       Time:       Nafisa White MD

## 2022-06-16 NOTE — PROGRESS NOTES
bPHYSICAL THERAPY - DAILY TREATMENT NOTE    Patient Name: Pavithra Leal        Date: 2022  : 2000   yes Patient  Verified  Visit #:   8   of   8  Insurance: Payor: Steve Rudy / Plan: Alex Horn [id number] PPO / Product Type: PPO /      In time: 3:03 PM Out time: 3:59   Total Treatment Time: 56     Medicare/BCBS Time Tracking (below)   Total Timed Codes (min): 52 1:1 Treatment Time:  42     TREATMENT AREA =  Pain in right hip [M25.551]    SUBJECTIVE  Pain Level (on 0 to 10 scale): 0 / 10   Medication Changes/New allergies or changes in medical history, any new surgeries or procedures?    no  If yes, update Summary List   Subjective Functional Status/Changes:  []  No changes reported     See PN       OBJECTIVE  Modalities Rationale:     decrease inflammation and decrease pain to improve patient's ability to ambulate   min [] Estim, type/location:                                      []  att     []  unatt     []  w/US     []  w/ice    []  w/heat    min []  Mechanical Traction: type/lbs                   []  pro   []  sup   []  int   []  cont    []  before manual    []  after manual    min []  Ultrasound, settings/location:      min []  Iontophoresis w/ dexamethasone, location:                                               []  take home patch       []  in clinic   PD min [x]  Ice     []  Heat    location/position:  To the R hip and sacrum in supine    min []  Vasopneumatic Device, press/temp:    If using vaso (only need to measure limb vaso being performed on)      pre-treatment girth :       post-treatment girth :       measured at (landmark location) :    Vasopnuematic compression justification:  Per bilateral girth measures taken and listed above the edema is considered significant and having an impact on the patient's n/a    min []  Other:    [x] Skin assessment post-treatment (if applicable):    [x]  intact    []  redness- no adverse reaction                  []redness - adverse reaction: 42  1:1  (52) min Therapeutic Exercise:  [x]  See flow sheet   Rationale:      increase ROM and increase strength to improve the patients ability to ambulate     TC min Manual Therapy: Supine hip abd, ER PROM, Supine: stm to hip flexor, scar tissue, proximal quad   Rationale:      decrease pain, increase ROM, increase tissue extensibility and decrease trigger points to improve patient's ability to complete ADLs  The manual therapy interventions were performed at a separate and distinct time from the therapeutic activities interventions. Billed With/As:   [x] TE   [] TA   [x] Neuro   [] Self Care Patient Education: [x] Review HEP    [] Progressed/Changed HEP based on:   [] positioning   [] body mechanics   [] transfers   [] heat/ice application    [x] other: ed ok to      Other Objective/Functional Measures:    -added dead bug level 2 with RLE kicking more vertically for decreased strain to hip flexor  -added gentle eileen stretch, hamstring/quad stretching   Post Treatment Pain Level (on 0 to 10) scale:   0  / 10     ASSESSMENT  Assessment/Changes in Function:   See PN     []  See Progress Note/Recertification   Patient will continue to benefit from skilled PT services to modify and progress therapeutic interventions, address functional mobility deficits, address ROM deficits, address strength deficits, analyze and address soft tissue restrictions, analyze and cue movement patterns, analyze and modify body mechanics/ergonomics, assess and modify postural abnormalities and instruct in home and community integration to attain remaining goals. Progress toward goals / Updated goals:  See PN     PLAN  []  Upgrade activities as tolerated yes Continue plan of care   []  Discharge due to :    []  Other:      Therapist: Danielito Silver.  Nita Nova PT    Date: 6/16/2022 Time: 6:41 PM      Future Appointments   Date Time Provider Michelle Luna   6/16/2022  3:00 PM Yenifer Burk PT [facility]   6/21/2022  3:45 PM Maryan Velardee [facility]   6/23/2022  3:00 PM Mimi Mcmahon., PT [facility]   6/28/2022  3:00 PM Duaine Sinan [facility]   6/30/2022  3:00 PM Mimi Mcmahon., PT [facility]   7/5/2022  3:00 PM Mimi Mcmahon., PT [facility]   7/7/2022  3:00 PM Mimi Mcmahon., PT [facility]   7/12/2022  3:00 PM Maryan Hayes [facility]   7/14/2022  3:15 PM [facility] PT CHILLED POND 2 [facility]   7/19/2022  3:45 PM Maryan Abigail [facility]   7/21/2022  3:00 PM Mimi Mcmahon., PT [facility]   7/26/2022  3:00 PM Mimi Mcmahon., PT [facility]   7/28/2022  3:00 PM Mimi Mcmahon., PT [facility]

## 2022-06-21 ENCOUNTER — HOSPITAL ENCOUNTER (OUTPATIENT)
Dept: PHYSICAL THERAPY | Age: 22
Discharge: HOME | End: 2022-06-21
Payer: COMMERCIAL

## 2022-06-21 PROCEDURE — 97140 MANUAL THERAPY 1/> REGIONS: CPT

## 2022-06-21 PROCEDURE — 97110 THERAPEUTIC EXERCISES: CPT

## 2022-06-21 NOTE — PROGRESS NOTES
bPHYSICAL THERAPY - DAILY TREATMENT NOTE    Patient Name: Sharin Primrose        Date: 2022  : 2000   yes Patient  Verified  Visit #:      16  Insurance: Payor: BLUE CROSS / Plan: Kathyaailyn BERNARDA Pierres [id number] PPO / Product Type: PPO /      In time: 3:48 PM Out time: 4:33   Total Treatment Time: 45     Medicare/BCBS Time Tracking (below)   Total Timed Codes (min): 45 1:1 Treatment Time:  40     TREATMENT AREA =  Pain in right hip [M25.551]    SUBJECTIVE  Pain Level (on 0 to 10 scale): 0/ 10   Medication Changes/New allergies or changes in medical history, any new surgeries or procedures?    no  If yes, update Summary List   Subjective Functional Status/Changes:  []  No changes reported   Pt reports she has been feeling fine since her last visit, one of her incision scars feels tight. OBJECTIVE  Modalities Rationale:     decrease inflammation and decrease pain to improve patient's ability to ambulate   min [] Estim, type/location:                                      []  att     []  unatt     []  w/US     []  w/ice    []  w/heat    min []  Mechanical Traction: type/lbs                   []  pro   []  sup   []  int   []  cont    []  before manual    []  after manual    min []  Ultrasound, settings/location:      min []  Iontophoresis w/ dexamethasone, location:                                               []  take home patch       []  in clinic   PD min [x]  Ice     []  Heat    location/position:  To the R hip and sacrum in supine    min []  Vasopneumatic Device, press/temp:    If using vaso (only need to measure limb vaso being performed on)      pre-treatment girth :       post-treatment girth :       measured at (landmark location) :    Vasopnuematic compression justification:  Per bilateral girth measures taken and listed above the edema is considered significant and having an impact on the patient's n/a    min []  Other:    [x] Skin assessment post-treatment (if applicable):    [x]  intact []  redness- no adverse reaction                  []redness - adverse reaction:      35  1:1  (30) min Therapeutic Exercise:  [x]  See flow sheet   Rationale:      increase ROM and increase strength to improve the patients ability to ambulate     10 min Manual Therapy: Supine hip abd, ER PROM, Supine: stm to hip flexor, scar tissue, proximal quad, manual hip flexor stretch   Rationale:      decrease pain, increase ROM, increase tissue extensibility and decrease trigger points to improve patient's ability to complete ADLs  The manual therapy interventions were performed at a separate and distinct time from the therapeutic activities interventions. Billed With/As:   [x] TE   [] TA   [x] Neuro   [] Self Care Patient Education: [x] Review HEP    [] Progressed/Changed HEP based on:   [] positioning   [] body mechanics   [] transfers   [] heat/ice application    [x] other: ed ok to      Other Objective/Functional Measures:  Progressed to bridge march  Added SLS on foam and soft board 2  Progressed hip hike to at stairs. Post Treatment Pain Level (on 0 to 10) scale:   0  / 10     ASSESSMENT  Assessment/Changes in Function:   Pt was instructed in bridge marches today with intention of continuing to progress hip strength. Pt was educated to observe hip flexor for signs of irritation and should hip flexor become painful to regress exercise to standard bridge. Pt was able to tolerate progression of hip hike to at stairs to improve hip strength and motor control. Pt was also instructed in SLS on foam and soft board number 2 to progress balance and hip dynamic stability. PT plans to employ cupping to incisional scars when scars are fully healed over.       []  See Progress Note/Recertification   Patient will continue to benefit from skilled PT services to modify and progress therapeutic interventions, address functional mobility deficits, address ROM deficits, address strength deficits, analyze and address soft tissue restrictions, analyze and cue movement patterns, analyze and modify body mechanics/ergonomics, assess and modify postural abnormalities and instruct in home and community integration to attain remaining goals. Progress toward goals / Updated goals:  1. Pt will increase R hip flexion strength to 5/5 for improved tolerance with walking while carrying items. (6/16: 4+/5)  2. Pt will demo good form/control with 6 inch lateral tap downs to promote ability to resume light jogging/walking intervals. (6/16: hemipelvic drop from 6 inch step)  3. Pt will increase R glute max strength to 5/5 for improved ability to get up from low surfaces. (6/16: 4/5)  4.  Improve FOTO score to >/= 75/100 to indicate improved function     PLAN  []  Upgrade activities as tolerated yes Continue plan of care   []  Discharge due to :    []  Other:      Therapist: Krunal Aceves    Date: 6/21/2022 Time: 6:41 PM      Future Appointments   Date Time Provider Michelle Luna   6/21/2022  3:45 PM Velo, Judie Hammans [facility]   6/23/2022  3:00 PM Eletha Peed., PT [facility]   6/28/2022  3:00 PM Derian Maloney [facility]   6/30/2022  3:00 PM Eletha Peed., PT [facility]   7/5/2022  3:00 PM Eletha Peed., PT [facility]   7/7/2022  3:00 PM Eletha Peed., PT [facility]   7/12/2022  3:00 PM Florencia Olivas [address] [facility]   7/14/2022  3:15 PM [facility] PT SUKH WISEMAND 2 [facility]   7/19/2022  3:45 PM Florencia Olivas [facility]   7/21/2022  3:00 PM Eletha Peed., PT [facility]   7/26/2022  3:00 PM Eletha Peed., PT [facility]   7/28/2022  3:00 PM Eletha Peed., PT [facility]

## 2022-06-23 ENCOUNTER — HOSPITAL ENCOUNTER (OUTPATIENT)
Dept: PHYSICAL THERAPY | Age: 22
Discharge: HOME | End: 2022-06-23
Payer: COMMERCIAL

## 2022-06-23 PROCEDURE — 97110 THERAPEUTIC EXERCISES: CPT

## 2022-06-23 PROCEDURE — 97140 MANUAL THERAPY 1/> REGIONS: CPT

## 2022-06-23 NOTE — PROGRESS NOTES
PHYSICAL THERAPY - DAILY TREATMENT NOTE    Patient Name: Emiliano Portillo        Date: 2022  : 2000   yes Patient  Verified  Visit #:   10   of   16  Insurance: Payor: Flo Saenz / Plan: [facility] PPO / Product Type: PPO /      In time: 2:58 PM Out time: 3:43   Total Treatment Time: 45     Medicare/BCBS Time Tracking (below)   Total Timed Codes (min): 41 1:1 Treatment Time:  41     TREATMENT AREA =  Pain in right hip [M25.551]    SUBJECTIVE  Pain Level (on 0 to 10 scale): 1 / 10- \"sore\"   Medication Changes/New allergies or changes in medical history, any new surgeries or procedures?    no  If yes, update Summary List   Subjective Functional Status/Changes:  []  No changes reported   Pt states her quads and glutes are sore from last session. She has a new puppy (2 months old) that scratched her right thigh while playing       OBJECTIVE  Modalities Rationale:     decrease inflammation and decrease pain to improve patient's ability to ambulate   min [] Estim, type/location:                                      []  att     []  unatt     []  w/US     []  w/ice    []  w/heat    min []  Mechanical Traction: type/lbs                   []  pro   []  sup   []  int   []  cont    []  before manual    []  after manual    min []  Ultrasound, settings/location:      min []  Iontophoresis w/ dexamethasone, location:                                               []  take home patch       []  in clinic   PD min [x]  Ice     []  Heat    location/position:  To the R hip and sacrum in supine    min []  Vasopneumatic Device, press/temp:        min []  Other:    [x] Skin assessment post-treatment (if applicable):    [x]  intact    []  redness- no adverse reaction                  []redness - adverse reaction:      33  1:1 min Therapeutic Exercise:  [x]  See flow sheet (-4 min bike)   Rationale:      increase ROM and increase strength to improve the patients ability to ambulate     8  1:1 min Manual Therapy: Supine hip abd, ER PROM, Supine: stm to hip flexor, scar tissue, proximal quad, manual hip flexor stretch   Rationale:      decrease pain, increase ROM, increase tissue extensibility and decrease trigger points to improve patient's ability to complete ADLs  The manual therapy interventions were performed at a separate and distinct time from the therapeutic activities interventions. Billed With/As:   [x] TE   [] TA   [x] Neuro   [] Self Care Patient Education: [x] Review HEP    [] Progressed/Changed HEP based on:   [] positioning   [] body mechanics   [] transfers   [] heat/ice application    [x] other: advised to maintain sterile environment to 2 small open areas on proximal thigh; advised on hydroseal bandaid 2/2 pt reporting standard bandaids rip top layer of skin when taking off. Other Objective/Functional Measures:  Skin tear R proximal, lateral thigh ~1 cm (from puppy); no signs of infection    -performed lateral static lunges b/l (to left performed for R adductor stretch/ROM)  -performed quad/hip flexor stretch in standing  -added dead lift  -right glute fatigue; unable to perform bridge march today     Post Treatment Pain Level (on 0 to 10) scale:   1  / 10     ASSESSMENT  Assessment/Changes in Function:   Pt with mild soft tissue congestion to proximal hip flexors/anterior hip; addressed with manual and stretching. Pt able to perform exercises today without adverse sx's, though limited due to glute fatigue (bridges). Plan progressive hip strengthening as per protocol.      []  See Progress Note/Recertification   Patient will continue to benefit from skilled PT services to modify and progress therapeutic interventions, address functional mobility deficits, address ROM deficits, address strength deficits, analyze and address soft tissue restrictions, analyze and cue movement patterns, analyze and modify body mechanics/ergonomics, assess and modify postural abnormalities and instruct in home and community integration to attain remaining goals. Progress toward goals / Updated goals:  1. Pt will increase R hip flexion strength to 5/5 for improved tolerance with walking while carrying items. (6/16: 4+/5)  2. Pt will demo good form/control with 6 inch lateral tap downs to promote ability to resume light jogging/walking intervals. (6/16: hemipelvic drop from 6 inch step)  3. Pt will increase R glute max strength to 5/5 for improved ability to get up from low surfaces. (6/16: 4/5)-6/23: in progress with bridges, hip thrusts, squats  4. Improve FOTO score to >/= 75/100 to indicate improved function     PLAN  []  Upgrade activities as tolerated yes Continue plan of care   []  Discharge due to :    []  Other:      Therapist: Farzad Knutson, PT    Date: 6/23/2022 Time: 3:52 PM      Future Appointments   Date Time Provider Michelle Luna   6/23/2022  3:00 PM Maite Pardo., PT [facility]   6/28/2022  3:00 PM Ruthe Kawasaki [facility]   6/30/2022  3:00 PM Maite Pardo., PT [facility]   7/5/2022  3:00 PM Maite Pardo., PT [id number]   7/7/2022  3:00 PM Maite Pardo., PT [facility]   7/12/2022  3:00 PM Hussein Herrera [address] [facility]   7/14/2022  3:15 PM [facility] PT CHILLED POND 2 [facility]   7/19/2022  3:45 PM Hussein Herrera [facility]   7/21/2022  3:00 PM Maite Pardo., PT [facility]   7/26/2022  3:00 PM Maite Mccoy, PT [facility]   7/28/2022  3:00 PM Maite Mccoy, PT [facility]

## 2022-06-28 ENCOUNTER — HOSPITAL ENCOUNTER (OUTPATIENT)
Dept: PHYSICAL THERAPY | Age: 22
Discharge: HOME | End: 2022-06-28
Payer: COMMERCIAL

## 2022-06-28 PROCEDURE — 97140 MANUAL THERAPY 1/> REGIONS: CPT

## 2022-06-28 PROCEDURE — 97110 THERAPEUTIC EXERCISES: CPT

## 2022-06-28 NOTE — PROGRESS NOTES
PHYSICAL THERAPY - DAILY TREATMENT NOTE    Patient Name: Deepak Rivera        Date: 2022  : 2000   yes Patient  Verified  Visit #:   6   of   16  Insurance: Payor: BLUE CROSS / Plan: [facility] PPO / Product Type: PPO /      In time: 3:06PM Out time: 3:53   Total Treatment Time: 47     Medicare/BCBS Time Tracking (below)   Total Timed Codes (min): 47 1:1 Treatment Time:  43     TREATMENT AREA =  Pain in right hip [M25.551]    SUBJECTIVE  Pain Level (on 0 to 10 scale): 0 / 10- \"sore\"   Medication Changes/New allergies or changes in medical history, any new surgeries or procedures?    no  If yes, update Summary List   Subjective Functional Status/Changes:  []  No changes reported   Pt reports she has been doing well, she feels like she is getting stronger       OBJECTIVE  Modalities Rationale:     decrease inflammation and decrease pain to improve patient's ability to ambulate   min [] Estim, type/location:                                      []  att     []  unatt     []  w/US     []  w/ice    []  w/heat    min []  Mechanical Traction: type/lbs                   []  pro   []  sup   []  int   []  cont    []  before manual    []  after manual    min []  Ultrasound, settings/location:      min []  Iontophoresis w/ dexamethasone, location:                                               []  take home patch       []  in clinic   PD min [x]  Ice     []  Heat    location/position:  To the R hip and sacrum in supine    min []  Vasopneumatic Device, press/temp:        min []  Other:    [x] Skin assessment post-treatment (if applicable):    [x]  intact    []  redness- no adverse reaction                  []redness - adverse reaction:      37  (33  1:1) min Therapeutic Exercise:  [x]  See flow sheet (-4 min bike)   Rationale:      increase ROM and increase strength to improve the patients ability to ambulate     10  1:1 min Manual Therapy: Supine hip abd, ER PROM, Supine: stm to hip flexor, scar tissue, proximal quad, manual hip flexor stretch   Rationale:      decrease pain, increase ROM, increase tissue extensibility and decrease trigger points to improve patient's ability to complete ADLs  The manual therapy interventions were performed at a separate and distinct time from the therapeutic activities interventions. Billed With/As:   [x] TE   [] TA   [x] Neuro   [] Self Care Patient Education: [x] Review HEP    [] Progressed/Changed HEP based on:   [] positioning   [] body mechanics   [] transfers   [] heat/ice application    [x] other: advised to maintain sterile environment to 2 small open areas on proximal thigh; advised on hydroseal bandaid 2/2 pt reporting standard bandaids rip top layer of skin when taking off. Other Objective/Functional Measures  Added R eccentric SLS bridge, and R side biased bridge  Added SLS kettle bell passes  Added eccentric HS curls with foam roller     Post Treatment Pain Level (on 0 to 10) scale:   0 / 10     ASSESSMENT  Assessment/Changes in Function:   Pt was able to tolerate new exercises as listed above without complaint. Pt was instructed in R eccentric , and R side biased bridges ( L foot on tip toes) with intention of progressing R glute strength for return to sport. Pt HEP was updated today to reflect new exercises with intention of continuing to progress strength while on hold. Pt will be on hold as she changes insurance. Plan is to resume care when authorized. []  See Progress Note/Recertification   Patient will continue to benefit from skilled PT services to modify and progress therapeutic interventions, address functional mobility deficits, address ROM deficits, address strength deficits, analyze and address soft tissue restrictions, analyze and cue movement patterns, analyze and modify body mechanics/ergonomics, assess and modify postural abnormalities and instruct in home and community integration to attain remaining goals.    Progress toward goals / Updated goals:  1. Pt will increase R hip flexion strength to 5/5 for improved tolerance with walking while carrying items. (6/16: 4+/5)  2. Pt will demo good form/control with 6 inch lateral tap downs to promote ability to resume light jogging/walking intervals. (6/16: hemipelvic drop from 6 inch step)  3. Pt will increase R glute max strength to 5/5 for improved ability to get up from low surfaces. (6/16: 4/5)-6/23: in progress with bridges, hip thrusts, squats  4.  Improve FOTO score to >/= 75/100 to indicate improved function     PLAN  []  Upgrade activities as tolerated yes Continue plan of care   []  Discharge due to :    []  Other:      Therapist: Ruddy Sinclair    Date: 6/28/2022 Time: 3:52 PM      Future Appointments   Date Time Provider Michelle Luna   6/28/2022  3:00 PM Marshal Abad [facility]   6/30/2022  3:00 PM Yusra Nails., PT [facility]   7/5/2022  3:00 PM Yusrakaylee Nails., PT [facility]   7/7/2022  3:00 PM Yusra Nails., PT [facility]   7/12/2022  3:00 PM Duc Huang [address] [employer] [facility]   7/14/2022  3:15 PM [facility] PT CHILLED POND 2 [facility]   7/19/2022  3:45 PM Duc Huang [facility]   7/21/2022  3:00 PM Yusra Nails., PT [facility]   7/26/2022  3:00 PM Yusra Nails., PT [facility]   7/28/2022  3:00 PM Yusra Nails., PT [facility]

## 2022-06-30 ENCOUNTER — APPOINTMENT (OUTPATIENT)
Dept: PHYSICAL THERAPY | Age: 22
End: 2022-06-30
Payer: COMMERCIAL

## 2022-07-05 ENCOUNTER — APPOINTMENT (OUTPATIENT)
Dept: PHYSICAL THERAPY | Age: 22
End: 2022-07-05
Payer: OTHER GOVERNMENT

## 2022-07-07 ENCOUNTER — TELEPHONE (OUTPATIENT)
Dept: FAMILY MEDICINE CLINIC | Age: 22
End: 2022-07-07

## 2022-07-07 ENCOUNTER — APPOINTMENT (OUTPATIENT)
Dept: PHYSICAL THERAPY | Age: 22
End: 2022-07-07
Payer: OTHER GOVERNMENT

## 2022-07-07 NOTE — TELEPHONE ENCOUNTER
Received fax from [employer] to schedule SARS CoV-2 lab. Received pt HERBERT and LM for her to call me back.

## 2022-07-12 ENCOUNTER — APPOINTMENT (OUTPATIENT)
Dept: PHYSICAL THERAPY | Age: 22
End: 2022-07-12
Payer: OTHER GOVERNMENT

## 2022-07-14 ENCOUNTER — HOSPITAL ENCOUNTER (OUTPATIENT)
Dept: PHYSICAL THERAPY | Age: 22
Discharge: HOME | End: 2022-07-14
Payer: OTHER GOVERNMENT

## 2022-07-14 PROCEDURE — 97110 THERAPEUTIC EXERCISES: CPT

## 2022-07-14 NOTE — PROGRESS NOTES
PHYSICAL THERAPY - DAILY TREATMENT NOTE    Patient Name: Lakesha Quintanilla        Date: 2022  : 2000   yes Patient  Verified  Visit #:     Insurance: Payor: VALERIO / Plan: Eric Fuentes 74 / Product Type:  /      In time: 7664 Out time: 1552 pm   Total Treatment Time: 48     Medicare/BCBS Time Tracking (below)   Total Timed Codes (min): n/a 1:1 Treatment Time: n/a     TREATMENT AREA =  Pain in right hip [M25.551]    SUBJECTIVE  Pain Level (on 0 to 10 scale): 0/10   Medication Changes/New allergies or changes in medical history, any new surgeries or procedures?    no  If yes, update Summary List   Subjective Functional Status/Changes:  []  No changes reported   Pt reports she is doing well. OBJECTIVE  Modalities Rationale:     decrease inflammation and decrease pain to improve patient's ability to ambulate   min [] Estim, type/location:                                      []  att     []  unatt     []  w/US     []  w/ice    []  w/heat    min []  Mechanical Traction: type/lbs                   []  pro   []  sup   []  int   []  cont    []  before manual    []  after manual    min []  Ultrasound, settings/location:      min []  Iontophoresis w/ dexamethasone, location:                                               []  take home patch       []  in clinic   PD min [x]  Ice     []  Heat    location/position:  To the R hip and sacrum in supine    min []  Vasopneumatic Device, press/temp:        min []  Other:    [x] Skin assessment post-treatment (if applicable):    [x]  intact    []  redness- no adverse reaction                  []redness - adverse reaction:      48 min Therapeutic Exercise:  [x]  See flow sheet:    Rationale:      increase ROM and increase strength to improve the patients ability to ambulate     Billed With/As:   [x] TE   [] TA   [] Neuro   [] Self Care Patient Education: [x] Review HEP    [] Progressed/Changed HEP based on:   [] positioning   [] body mechanics   [] transfers   [] heat/ice application    [] other:      Other Objective/Functional Measures  Pain at worst: 0  Subjective % improvement: 80%  Functional improvements: I can carry and lift heavy stuff now, walk for a long time, stated new job 12 hour shifts \"it doesn't bother me\"  Functional limitations: \"anything internal or external rotation\" \"the range isn't there\"  FOTO: 71   Strength:   Right (/5)   Hip     Flexion 4+/5             Abduction 4+/5             Adduction 4-/5             Extension 4+-5             ER 4+             IR 4-     AROM (degrees) Right   Hip     Flexion supine 115             Extension prone 18             ER prone 45             IR prone 55      Slight pelvic drop with lateral step downs on 6 inch step     Post Treatment Pain Level (on 0 to 10) scale:   0 / 10     ASSESSMENT  Assessment/Changes in Function:   See PN     [x]  See Progress Note/Recertification   Patient will continue to benefit from skilled PT services to modify and progress therapeutic interventions, address functional mobility deficits, address ROM deficits, address strength deficits, analyze and address soft tissue restrictions, analyze and cue movement patterns, analyze and modify body mechanics/ergonomics, assess and modify postural abnormalities and instruct in home and community integration to attain remaining goals. Progress toward goals / Updated goals:  1. Pt will increase R hip flexion strength to 5/5 for improved tolerance with walking while carrying items. (6/16: 4+/5)   7/14/22: Strength:   Right (/5)   Hip     Flexion 4+/5             Abduction 4+/5             Adduction 4-/5             Extension 4+-5             ER 4+             IR 4-     2. Pt will demo good form/control with 6 inch lateral tap downs to promote ability to resume light jogging/walking intervals. (6/16: hemipelvic drop from 6 inch step)  7/14/22: Slight pelvic drop with lateral step downs on 6 inch step  3.  Pt will increase R glute max strength to 5/5 for improved ability to get up from low surfaces. (6/16: 4/5)-6/23: in progress with bridges, hip thrusts, squats  7/14/22: Strength:   Right (/5)   Hip     Flexion 4+/5             Abduction 4+/5             Adduction 4-/5             Extension 4+-5             ER 4+             IR 4-      4. Improve FOTO score to >/= 75/100 to indicate improved function.  7/14/22: 71     PLAN  []  Upgrade activities as tolerated yes Continue plan of care   []  Discharge due to :    [x]  Other: See PN     Therapist: Humphrey Mcintyre PT    Date: 7/14/2022 Time: 5:23 PM      Future Appointments   Date Time Provider Michelle Luna   7/21/2022  3:00 PM Lorean Ort., PT [facility]   7/28/2022  3:00 PM Lorean Ort., PT [facility]   8/4/2022  1:30 PM Lorean Ort., PT [facility]   8/11/2022  2:00 PM Joycenely Santos, PT [facility]   8/18/2022  2:00 PM Joycenely Santos, PT [facility]

## 2022-07-14 NOTE — PROGRESS NOTES
[address] [place] [address] PHYSICAL THERAPY  [address], [place], [address] [facility] [address] - Phone: [phone]  Fax: [phone]  PROGRESS NOTE  Patient Name: Asad Perez : 2000   Treatment/Medical Diagnosis: Pain in right hip [M25.551]   Referral Source: Renee Marcus MD     Date of Initial Visit: 22 Attended Visits: 12 Missed Visits: 1     Current Status/Treatment summary:  Pt is a 19y.o. year old female who has been receiving skilled OP PT services at [facility] with Pain in right hip [M25.551] s/p right hip acetabular labral tear, acetabular rim recession, femoral osteochondroplasty, capsular plication on 4/10/77. Physical therapy has consisted of therapeutic exercises for increased strength, improved ROM;  modalities to decrease pain, decrease inflammation; manual therapy for decreased pain, improved ROM, improved flexibility. Pt currently 8 weeks post op. Pt has been seen for initial evaluation and 11 follow up visits, making great progress toward set goals. Pt reports 80% improvement since [facility]. Functional improvements: I can carry and lift heavy stuff now, walk for a long time, stated new job 12 hour shifts \"it doesn't bother me\"  Continued Functional limitations: \"anything internal or external rotation\" \"the range isn't there\"    Other Objective/Functional Measures:  Pain at worst: 0    FOTO: 71   Strength:    Right (/5)   Hip     Flexion 4+/5             Abduction 4+/5             Adduction 4-/5             Extension 4+-5             ER 4+             IR 4-      AROM (degrees) Right   Hip     Flexion supine 115             Extension prone 18             ER prone 45             IR prone 55      Slight pelvic drop with lateral step downs on 6 inch step       Goal/Measure of Progress Goal Met? 1. Pt will increase R hip flexion strength to 5/5 for improved tolerance with walking while carrying items.     Status at last Eval: Hip flex 4+/5, Ext 4/5, Abd 5/5, Knee ext/flex 5/5. Current Status: 4+/5 no   2. Pt will demo good form/control with 6 inch lateral tap downs to promote ability to resume light jogging/walking intervals. Status at last Eval: Lateral tap down with hemipelvic drop off 6 inch step height Current Status: Slight pelvic drop with lateral step downs on 6 inch step no   3. Pt will increase R glute max strength to 5/5 for improved ability to get up from low surfaces. Status at last Eval: Hip flex 4+/5, Ext 4/5, Abd 5/5, Knee ext/flex 5/5. Current Status: 4+ to 5 progressing     4. Improve FOTO score to >/= 75/100 to indicate improved function    Status at last Eval: 63 Current Status: 71 Progressing        New Goals to be achieved in __4__  weeks: continue with above unmet goals   1. Pt will increase R hip flexion strength to 5/5 for improved tolerance with walking while carrying items. PN: Strength:    Right (/5)   Hip     Flexion 4+/5   Current:   2. Pt will demo good form/control with 6 inch lateral tap downs to promote ability to resume light jogging/walking intervals. PN: Slight pelvic drop with lateral step downs on 6 inch step  Current:   3. Pt will increase R glute max strength to 5/5 for improved ability to get up from low surfaces. PN: Strength:    Right (/5)   Hip     Extension 4+ to 5   Current:    4. Improve FOTO score to >/= 75/100 to indicate improved function. PN: 71  Current:     RECOMMENDATIONS  Pt will continue to benefit from skilled OP PT 1-2 per week for 4 weeks in order to address remaining and above mentioned impairments to return to PLOF and maximize patient's functional status and independence. If you have any questions/comments please contact us directly at [phone]. Thank you for allowing us to assist in the care of your patient.     Therapist Signature: Kwadwo Iyer PT Date: 7/14/2022   Reporting period:  6/16/22 - 7/14/22  Time: 5:41 PM    NOTE TO PHYSICIAN:  PLEASE COMPLETE THE ORDERS BELOW AND FAX TO   [facility]: [phone]  If you are unable to process this request in 24 hours please contact our office: [phone]    ___ I have read the above report and request that my patient continue as recommended.   ___ I have read the above report and request that my patient continue therapy with the following changes/special instructions:_________________________________________________________   ___ I have read the above report and request that my patient be discharged from therapy.      Physician Signature:        Date:       Time:       Clara Valadez MD

## 2022-07-19 ENCOUNTER — APPOINTMENT (OUTPATIENT)
Dept: PHYSICAL THERAPY | Age: 22
End: 2022-07-19
Payer: OTHER GOVERNMENT

## 2022-07-21 ENCOUNTER — HOSPITAL ENCOUNTER (OUTPATIENT)
Dept: PHYSICAL THERAPY | Age: 22
Discharge: HOME | End: 2022-07-21
Payer: OTHER GOVERNMENT

## 2022-07-21 PROCEDURE — 97110 THERAPEUTIC EXERCISES: CPT

## 2022-07-21 PROCEDURE — 97140 MANUAL THERAPY 1/> REGIONS: CPT

## 2022-07-21 NOTE — PROGRESS NOTES
PHYSICAL THERAPY - DAILY TREATMENT NOTE    Patient Name: Britany Molina        Date: 2022  : 2000   yes Patient  Verified  Visit #:   15   of   16-20  Insurance: Payor:  / Plan: Eric Fuentes 74 / Product Type:  /      In time: 3:05 PM Out time: 4:12 PM   Total Treatment Time: 67     Medicare/BCBS Time Tracking (below)   Total Timed Codes (min): 57 1:1 Treatment Time: n/a     TREATMENT AREA =  Pain in right hip [M25.551]    SUBJECTIVE  Pain Level (on 0 to 10 scale): 2/10   Medication Changes/New allergies or changes in medical history, any new surgeries or procedures?    no  If yes, update Summary List   Subjective Functional Status/Changes:  []  No changes reported   \"I had a horrible time sleeping (last ni ght) b/c my hip was hurting; it was almost like my right leg was shorter than my left\".  Reports pain was up to 5/10    -pt c/o sharp pain with hip IR/abduction movements       OBJECTIVE  Modalities Rationale:     decrease inflammation and decrease pain to improve patient's ability to ambulate   min [] Estim, type/location:                                      []  att     []  unatt     []  w/US     []  w/ice    []  w/heat    min []  Mechanical Traction: type/lbs                   []  pro   []  sup   []  int   []  cont    []  before manual    []  after manual    min []  Ultrasound, settings/location:      min []  Iontophoresis w/ dexamethasone, location:                                               []  take home patch       []  in clinic   10 min [x]  Ice     []  Heat    location/position: B/l hips in prone    min []  Vasopneumatic Device, press/temp:        min []  Other:    [x] Skin assessment post-treatment (if applicable):    [x]  intact    []  redness- no adverse reaction                  []redness - adverse reaction:      47 min Therapeutic Exercise:  [x]  See flow sheet:    Rationale:      increase ROM and increase strength to improve the patients ability to ambulate 10 min Manual Therapy:  METs for pubic clearing, R posteriorly rotated innominate in supine/left sidelying. Grade 5 RLE LAD   The manual therapy interventions were performed at a separate and distinct time from the therapeutic activities interventions. Rationale: decrease pain, increase ROM, and increase tissue extensibility to tolerate standing, walking activities      Billed With/As:   [x] TE   [] TA   [] Neuro   [] Self Care Patient Education: [x] Review HEP    [] Progressed/Changed HEP based on:   [] positioning   [] body mechanics   [] transfers   [] heat/ice application    [] other:      Other Objective/Functional Measures  -modified adductor slider to standing band hip add 2/2 pt report of pain with abd movements  -decreased wb'ing RLE with hip thrusts today 2/2 pain c/o  -progressed eileen to 1/2 kneel psoas stretch   -added fwd T     Post Treatment Pain Level (on 0 to 10) scale:   0 / 10     ASSESSMENT  Assessment/Changes in Function:   Pt with (+) R posteriorly rotated innominate; slow improvement with MET's, with most improvement noted after prone psoas activation. F/b L single leg bridges and pt ed on self SI METs for R post innominate. Pt able to advance with proprioceptive stability training with good form with fwd T. Plan continued global hip strengthening to tolerance with care for psoas strain. [x]  See Progress Note/Recertification   Patient will continue to benefit from skilled PT services to modify and progress therapeutic interventions, address functional mobility deficits, address ROM deficits, address strength deficits, analyze and address soft tissue restrictions, analyze and cue movement patterns, analyze and modify body mechanics/ergonomics, assess and modify postural abnormalities and instruct in home and community integration to attain remaining goals. Progress toward goals / Updated goals:  -no changes yet from PN completed last session.     New Goals to be achieved in __4__ weeks: continue with above unmet goals   1. Pt will increase R hip flexion strength to 5/5 for improved tolerance with walking while carrying items. PN: Strength:    Right (/5)   Hip     Flexion 4+/5   Current:   2. Pt will demo good form/control with 6 inch lateral tap downs to promote ability to resume light jogging/walking intervals. PN: Slight pelvic drop with lateral step downs on 6 inch step  Current:   3. Pt will increase R glute max strength to 5/5 for improved ability to get up from low surfaces. PN: Strength:    Right (/5)   Hip     Extension 4+ to 5   Current:    4. Improve FOTO score to >/= 75/100 to indicate improved function. PN: 70  Current:        PLAN  []  Upgrade activities as tolerated yes Continue plan of care   []  Discharge due to :    [x]  Other: See PN     Therapist: Praveen Barnhart.  Fanta Chang, ZANE    Date: 7/21/2022 Time: 5:23 PM      Future Appointments   Date Time Provider Michelle Luna   7/21/2022  3:00 PM Donna Brito., PT [facility]   7/28/2022  3:00 PM Donna Brito., PT [facility]   8/4/2022  1:30 PM Donna Nair, PT [facility]   8/11/2022  2:00 PM Alexis Sanchez PT [facility]   8/18/2022  2:00 PM Alexis Sanchez PT [facility]

## 2022-07-26 ENCOUNTER — APPOINTMENT (OUTPATIENT)
Dept: PHYSICAL THERAPY | Age: 22
End: 2022-07-26
Payer: OTHER GOVERNMENT

## 2022-07-28 ENCOUNTER — HOSPITAL ENCOUNTER (OUTPATIENT)
Dept: PHYSICAL THERAPY | Age: 22
Discharge: HOME | End: 2022-07-28
Payer: OTHER GOVERNMENT

## 2022-07-28 PROCEDURE — 97110 THERAPEUTIC EXERCISES: CPT

## 2022-07-28 PROCEDURE — 97140 MANUAL THERAPY 1/> REGIONS: CPT

## 2022-07-28 NOTE — PROGRESS NOTES
PHYSICAL THERAPY - DAILY TREATMENT NOTE    Patient Name: Mario De Jesus        Date: 2022  : 2000   yes Patient  Verified  Visit #:   15   of   16-20  Insurance: Payor:  / Plan: Eric Fuentes 72 / Product Type:  /      In time: 3:02 PM Out time: 3:50   Total Treatment Time: 48     Medicare/[facility] Time Tracking (below)   Total Timed Codes (min): 44 1:1 Treatment Time: n/a     TREATMENT AREA =  Pain in right hip [M25.551]    SUBJECTIVE  Pain Level (on 0 to 10 scale):   /10   Medication Changes/New allergies or changes in medical history, any new surgeries or procedures?    no  If yes, update Summary List   Subjective Functional Status/Changes:  []  No changes reported   Pt c/o increased R hip pain to post/lateral hip for the past week. She did have a long work shift yesterday (12 hrs). She has been doing her self METs every night and \"it feels better, but the tightness doesn't go away\"       OBJECTIVE  Modalities Rationale:     decrease inflammation and decrease pain to improve patient's ability to ambulate   min [] Estim, type/location:                                      []  att     []  unatt     []  w/US     []  w/ice    []  w/heat    min []  Mechanical Traction:                       min []  Ultrasound,      min                                                []  take home patch       []  in clinic   10 min [x]  Ice     []  Heat    location/position: B/l hips in prone    min         min []  Other:    [x] Skin assessment post-treatment (if applicable):    [x]  intact    []  redness- no adverse reaction                  []redness - adverse reaction:      34 min Therapeutic Exercise:  [x]  See flow sheet:  (-4 min Lat X)   Rationale:      increase ROM and increase strength to improve the patients ability to ambulate     10 min Manual Therapy:  METs for pubic clearing, R posteriorly rotated innominate in supine/left sidelying. Grade 5 RLE LAD. STM b/l lumbar paraspinals.    The manual therapy interventions were performed at a separate and distinct time from the therapeutic activities interventions. Rationale: decrease pain, increase ROM, and increase tissue extensibility to tolerate standing, walking activities      Billed With/As:   [x] TE   [] TA   [] Neuro   [] Self Care Patient Education: [x] Review HEP    [] Progressed/Changed HEP based on:   [] positioning   [] body mechanics   [] transfers   [] heat/ice application    [] other:      Other Objective/Functional Measures  --advanced proprioceptive challenge to star taps on SB#2  -advanced TRX squat to split stance   -progressed TRX lat lunge to dynamic lunge (previously static lunge)  -TC for some exercises; reviewed with pt for HEP completion today     Post Treatment Pain Level (on 0 to 10) scale:   1 / 10     ASSESSMENT  Assessment/Changes in Function:   Pt with difficult to correct SI obliquity today, however good correction after STM. Pt demonstrates slowly progressing strength as per ability to advance with exercises as outlined above. Plan continued glute/core strength to reduce SI dysfunction and R hip pain with functional activities. [x]  See Progress Note/Recertification   Patient will continue to benefit from skilled PT services to modify and progress therapeutic interventions, address functional mobility deficits, address ROM deficits, address strength deficits, analyze and address soft tissue restrictions, analyze and cue movement patterns, analyze and modify body mechanics/ergonomics, assess and modify postural abnormalities and instruct in home and community integration to attain remaining goals. Progress toward goals / Updated goals:    New Goals to be achieved in __4__  weeks: continue with above unmet goals   1. Pt will increase R hip flexion strength to 5/5 for improved tolerance with walking while carrying items. PN: Strength:    Right (/5)   Hip     Flexion 4+/5   Current: 7/28: goal not met, 4+/5  2.  Pt will demo good form/control with 6 inch lateral tap downs to promote ability to resume light jogging/walking intervals. PN: Slight pelvic drop with lateral step downs on 6 inch step  Current:   3. Pt will increase R glute max strength to 5/5 for improved ability to get up from low surfaces. PN: Strength:    Right (/5)   Hip     Extension 4+ to 5   Current:    4. Improve FOTO score to >/= 75/100 to indicate improved function. PN: 70  Current:        PLAN  []  Upgrade activities as tolerated yes Continue plan of care   []  Discharge due to :    [x]  Other: See PN     Therapist: William Smyth.  Devendra Doss PT    Date: 7/28/2022 Time: 6:25 PM      Future Appointments   Date Time Provider Michelle Luna   7/28/2022  3:00 PM [place] Elizabeth Generous., PT [facility]   8/4/2022  1:30 PM [place] Kimberly., PT [facility]   8/11/2022  2:00 PM Rod Garcia, PT [facility]   8/18/2022  2:00 PM Rdo Garcia, PT [facility]

## 2022-08-04 ENCOUNTER — HOSPITAL ENCOUNTER (OUTPATIENT)
Dept: PHYSICAL THERAPY | Age: 22
End: 2022-08-04
Payer: OTHER GOVERNMENT

## 2022-08-11 ENCOUNTER — HOSPITAL ENCOUNTER (OUTPATIENT)
Dept: PHYSICAL THERAPY | Age: 22
Discharge: HOME | End: 2022-08-11
Payer: OTHER GOVERNMENT

## 2022-08-11 PROCEDURE — 97112 NEUROMUSCULAR REEDUCATION: CPT

## 2022-08-11 PROCEDURE — 97110 THERAPEUTIC EXERCISES: CPT

## 2022-08-11 PROCEDURE — 97140 MANUAL THERAPY 1/> REGIONS: CPT

## 2022-08-11 NOTE — PROGRESS NOTES
PHYSICAL THERAPY - DAILY TREATMENT NOTE    Patient Name: Catie Dunn        Date: 2022  : 2000   yes Patient  Verified  Visit #:     Insurance: Payor: VALERIO / Plan: Eric Fuentes 72 / Product Type:  /      In time: 2:02 Out time: 2:57   Total Treatment Time: 55     Medicare/[employer] Time Tracking (below)   Total Timed Codes (min): n/a 1:1 Treatment Time: n/a     TREATMENT AREA =  Pain in right hip [M25.551]    SUBJECTIVE  Pain Level (on 0 to 10 scale): 0  /10   Medication Changes/New allergies or changes in medical history, any new surgeries or procedures?    no  If yes, update Summary List   Subjective Functional Status/Changes:  []  No changes reported     \"Nothing new since I was here last. Working 3 12 hour shifts just does me in\". Localizes pain to R posterolateral hip. OBJECTIVE  Modalities Rationale:     decrease inflammation and decrease pain to improve patient's ability to ambulate   min [] Estim, type/location:                                      []  att     []  unatt     []  w/US     []  w/ice    []  w/heat    min []  Mechanical Traction:                       min []  Ultrasound,      min                                                []  take home patch       []  in clinic   10 min [x]  Ice     []  Heat    location/position: R hip in prone    min         min []  Other:    [x] Skin assessment post-treatment (if applicable):    [x]  intact    []  redness- no adverse reaction                  []redness - adverse reaction:      25 min Therapeutic Exercise:  [x]  See flow sheet:   Rationale:      increase ROM and increase strength to improve the patients ability to ambulate     10 min Manual Therapy:  STM to R glute med, piriformis, deep hip external rotators, prone hip IR str   The manual therapy interventions were performed at a separate and distinct time from the therapeutic activities interventions.   Rationale: decrease pain, increase ROM, and increase tissue extensibility to tolerate standing, walking activities    10 min Therapeutic Activity: [x]  See flow sheet   Rationale:    increase strength and improve coordination to improve the patients ability to tolerate prolonged standing      Billed With/As:   [x] TE   [] TA   [] Neuro   [] Self Care Patient Education: [x] Review HEP    [] Progressed/Changed HEP based on:   [] positioning   [] body mechanics   [] transfers   [] heat/ice application    [] other:      Other Objective/Functional Measures    See PN     Post Treatment Pain Level (on 0 to 10) scale:   0 / 10     ASSESSMENT  Assessment/Changes in Function:     See PN     [x]  See Progress Note/Recertification   Patient will continue to benefit from skilled PT services to modify and progress therapeutic interventions, address functional mobility deficits, address ROM deficits, address strength deficits, analyze and address soft tissue restrictions, analyze and cue movement patterns, analyze and modify body mechanics/ergonomics, assess and modify postural abnormalities and instruct in home and community integration to attain remaining goals.    Progress toward goals / Updated goals:    See PN     PLAN  []  Upgrade activities as tolerated yes Continue plan of care   []  Discharge due to :    [x]  Other: See PN     Therapist: Ronit Block, PT    Date: 8/11/2022 Time: 6:25 PM      Future Appointments   Date Time Provider Michelle Luna   8/11/2022  2:00 PM Abel Bautista PT [facility]   8/18/2022  2:00 PM [facility]

## 2022-08-11 NOTE — PROGRESS NOTES
[address] [place] [address] PHYSICAL THERAPY  [facility], [place], [address] - Phone: [phone]  Fax: [phone]  PROGRESS NOTE  Patient Name: Yesi Martínez : 2000   Treatment/Medical Diagnosis: Pain in right hip [M25.551]   Referral Source: Bertin Ledbetter MD     Date of Initial Visit: 2022 Attended Visits: 15 Missed Visits: 2     SUMMARY OF TREATMENT  Pt has attended 14 sessions of PT for the tx of R hip pain s/p R acetabular labral tear, rim recession, femoral ostochondroplasty, and capsular plication on 6984. PT tx has consisted of therex, theract, NMRE, manual therapy, and HEP instruction in order to improve ROM, mobility, flexibility, strength, stability, functional movement, and dec pain. CURRENT STATUS  Pt reports 85% overall improvement in sx since beginning care. Pt reports 9/10 max pain, located R posterolateral hip after 3 consecutive 12 hour shifts (sharp pain);  2/10 avg pain. Improvements: balance, endurance, strength  Remaining functional limitations: pain with consecutive work shifts (inc pain R posterolateral hip at end of 3rd consecutive 12 hour shift); rotational ROM/flexibility     Objective Measurements:     AROM (degrees) Right   Hip     Flexion supine 119             Extension prone 12 (prior to l/s compensation)             ER prone 26             IR prone 36 \"tight\"     Strength:    Right (/5)   Hip     Flexion 5/5             Abduction 4+/5             Adduction 4/5             Extension 4-/5             ER 4+/5             IR 4-/5     Functional assessment:   Squat good mechanics but noticeable PPT at ~80 deg   Lateral tap down 6\" step\" mild genu valgum angulation on the R   SL bridge full range bilaterally    FOTO 74/100    Goal/Measure of Progress Goal Met? 1. Pt will increase R hip flexion strength to 5/5 for improved tolerance with walking while carrying items   Status at last Eval: 4+/5 Current Status: 5/5 yes   2.   Pt will demo good form/control with 6 inch lateral tap downs to promote ability to resume light jogging/walking intervals. Status at last Eval: Mild pelvic drop Current Status: Neutral pelvis but mild genu valgum angulation progress   3. Pt will increase R glute max strength to 5/5 for improved ability to get up from low surfaces. Status at last Eval: 4+/5 Current Status: 4-/5 no     4. Improve FOTO score to >/= 75/100 to indicate improved function   Status at last Eval: IE: 23/100  Last PN: 71/100 Current Status: 74 Nearly met     New Goals to be achieved in __4__  weeks:  1. Pt will demo good take-off and landing mechanics 6\" DL to DL and DL to SL box jumps to progress towards running  2. Improve R glute max strength to 5/5 for improve ability to get up from low surfaces  3. Improve FOTO score to > 75/100 to indicate improved function  4. Pt will report pain < 4/10 after 3 consecutive 12 hour shifts to improve tolerance to standing    RECOMMENDATIONS  Pt progressing well in PT, continues w/ R posterolateral hip pain primarily occurring after prolonged WB for consecutive 12 hour shifts. Pt continues to demo weak glutes and adductors; skilled PT is necessary to continue to address impairments and progress pt towards personal goal of returning to running. Continue 2x/wk x 4 weeks. If you have any questions/comments please contact us directly at [phone]. Thank you for allowing us to assist in the care of your patient.     Therapist Signature: Gee Pennington, PT Date: 8/11/2022     Time: 1:48 PM   NOTE TO PHYSICIAN:  PLEASE COMPLETE THE ORDERS BELOW AND FAX TO   [facility]: [phone]  If you are unable to process this request in 24 hours please contact our office: [phone]    ___ I have read the above report and request that my patient continue as recommended.   ___ I have read the above report and request that my patient continue therapy with the following changes/special instructions:_________________________________________________________   ___ I have read the above report and request that my patient be discharged from therapy.      Physician Signature:        Date:       Time:       Rebecca Ramey MD

## 2022-08-18 ENCOUNTER — HOSPITAL ENCOUNTER (OUTPATIENT)
Dept: PHYSICAL THERAPY | Age: 22
Discharge: HOME | End: 2022-08-18
Payer: OTHER GOVERNMENT

## 2022-08-18 PROCEDURE — 97110 THERAPEUTIC EXERCISES: CPT

## 2022-08-18 PROCEDURE — 97530 THERAPEUTIC ACTIVITIES: CPT

## 2022-08-18 NOTE — PROGRESS NOTES
PHYSICAL THERAPY - DAILY TREATMENT NOTE    Patient Name: Sharon Nathan        Date: 2022  : 2000   yes Patient  Verified  Visit #:     Insurance: Payor: VALERIO / Plan: Dean Santos / Product Type:  /      In time: 2:00 Out time: 2:45   Total Treatment Time: 45     Medicare/[employer] Time Tracking (below)   Total Timed Codes (min): n/a 1:1 Treatment Time: n/a     TREATMENT AREA =  Pain in right hip [M25.551]    SUBJECTIVE  Pain Level (on 0 to 10 scale): 0  /10   Medication Changes/New allergies or changes in medical history, any new surgeries or procedures?    no  If yes, update Summary List   Subjective Functional Status/Changes:  []  No changes reported   Pt reports no pain in the hip today, that it is slowly getting better. OBJECTIVE  Modalities Rationale:     decrease inflammation and decrease pain to improve patient's ability to ambulate   min [] Estim, type/location:                                      []  att     []  unatt     []  w/US     []  w/ice    []  w/heat    min []  Mechanical Traction:                       min []  Ultrasound,      min                                                []  take home patch       []  in clinic   PD min [x]  Ice     []  Heat    location/position: R hip in prone    min         min []  Other:    [x] Skin assessment post-treatment (if applicable):    [x]  intact    []  redness- no adverse reaction                  []redness - adverse reaction:      30 min Therapeutic Exercise:  [x]  See flow sheet:   Rationale:      increase ROM and increase strength to improve the patients ability to ambulate     x min Manual Therapy:  STM to R glute med, piriformis, deep hip external rotators, prone hip IR str   The manual therapy interventions were performed at a separate and distinct time from the therapeutic activities interventions.   Rationale: decrease pain, increase ROM, and increase tissue extensibility to tolerate standing, walking activities    15 min Therapeutic Activity: [x]  See flow sheet   Rationale:    increase strength and improve coordination to improve the patients ability to tolerate prolonged standing      Billed With/As:   [x] TE   [] TA   [] Neuro   [] Self Care Patient Education: [] Review HEP    [] Progressed/Changed HEP based on:   [] positioning   [] body mechanics   [] transfers   [] heat/ice application    [x] other: pt edu re return to running. Other Objective/Functional Measures  Added storks and running storks   progressed hip thruster to on stability ball  Progressed bench/table hip thruster to hip thruster with LE ext   Progressed squat to jump squat   Post Treatment Pain Level (on 0 to 10) scale:   0 / 10     ASSESSMENT  Assessment/Changes in Function: storks and running storks, progressed hip thruster to on stability ball, and hip thruster to hip thruster with LE ext to continue to progress glute strength for return to running. Pt was also able to progress to TRX jump squats with intention of improving LE strength and power for return to running. Pt was educated that as she is >12 weeks post op she is now able to return to running, pt was advised to start small ( I.e. walk/jog for 1/4 mile) and wait 2 days to assess tolerance of activity. Pt verbalized understanding. Pt declined modalities in clinic today. [x]  See Progress Note/Recertification   Patient will continue to benefit from skilled PT services to modify and progress therapeutic interventions, address functional mobility deficits, address ROM deficits, address strength deficits, analyze and address soft tissue restrictions, analyze and cue movement patterns, analyze and modify body mechanics/ergonomics, assess and modify postural abnormalities and instruct in home and community integration to attain remaining goals. Progress toward goals / Updated goals:  1.  Pt will demo good take-off and landing mechanics 6\" DL to DL and DL to SL box jumps to progress towards running progressed to TRX jump squat today 8/18/22  2. Improve R glute max strength to 5/5 for improve ability to get up from low surfaces  3. Improve FOTO score to > 75/100 to indicate improved function  4.  Pt will report pain < 4/10 after 3 consecutive 12 hour shifts to improve tolerance to standing        PLAN  [x]  Upgrade activities as tolerated yes Continue plan of care   []  Discharge due to :    []  Other:      Therapist: Neeta Garay    Date: 8/18/2022 Time: 6:25 PM      Future Appointments   Date Time Provider Michelle Luna   8/18/2022  2:00 PM Gavin Hardy [facility]   8/26/2022 11:45 AM Stephanie Parish, PT [facility]

## 2022-08-26 ENCOUNTER — HOSPITAL ENCOUNTER (OUTPATIENT)
Dept: PHYSICAL THERAPY | Age: 22
Discharge: HOME | End: 2022-08-26
Payer: OTHER GOVERNMENT

## 2022-08-26 PROCEDURE — 97530 THERAPEUTIC ACTIVITIES: CPT

## 2022-08-26 PROCEDURE — 97110 THERAPEUTIC EXERCISES: CPT

## 2022-08-26 NOTE — PROGRESS NOTES
PHYSICAL THERAPY - DAILY TREATMENT NOTE    Patient Name: Yesi Martínez        Date: 2022  : 2000   yes Patient  Verified  Visit #:     Insurance: Payor:  / Plan: Ramo Mike / Product Type:  /      In time: 11:53 Out time: 1:03   Total Treatment Time: 70     Medicare/[employer] Time Tracking (below)   Total Timed Codes (min): n/a 1:1 Treatment Time: n/a     TREATMENT AREA =  Pain in right hip [M25.551]    SUBJECTIVE  Pain Level (on 0 to 10 scale): 0  /10   Medication Changes/New allergies or changes in medical history, any new surgeries or procedures?    no  If yes, update Summary List   Subjective Functional Status/Changes:  []  No changes reported     \"It's not as bad as what it was but it still gets up to a 6/10 at the end of work\". Pt reports having sharp, shooting pain down the R LE when walking into her shift yesterday, lasted entire shift. Went home and did some core activation exercises as well as hip exercises, which reduced her pain. Pt reports she has not trialed running yet.         OBJECTIVE  Modalities Rationale:     decrease inflammation and decrease pain to improve patient's ability to ambulate   min [] Estim, type/location:                                      []  att     []  unatt     []  w/US     []  w/ice    []  w/heat    min []  Mechanical Traction:                       min []  Ultrasound,      min                                                []  take home patch       []  in clinic   10 min [x]  Ice     []  Heat    location/position: R hip in supine    min         min []  Other:    [x] Skin assessment post-treatment (if applicable):    [x]  intact    []  redness- no adverse reaction                  []redness - adverse reaction:      25 min Therapeutic Exercise:  [x]  See flow sheet:   Rationale:      increase ROM and increase strength to improve the patients ability to ambulate     25 min Therapeutic Activity: [x]  See flow sheet Rationale:    increase strength and improve coordination to improve the patients ability to tolerate prolonged standing      Billed With/As:   [x] TE   [] TA   [] Neuro   [] Self Care Patient Education: [] Review HEP    [] Progressed/Changed HEP based on:   [] positioning   [] body mechanics   [] transfers   [] heat/ice application    [] other:     Other Objective/Functional Measures    -added high step up with knee drive and medball chop; medball hip toss, rear-foot elevated banded clams; TRX split squat jumps, and modified [place]enh[place] plank  -pt w/ significant lack of power production through R LE when attempting TRX split squats  - negative slump, piriformis testing on the R     Post Treatment Pain Level (on 0 to 10) scale:   0 / 10     ASSESSMENT  Assessment/Changes in Function:     Pt tolerated progression of therex/theract well w/o c/o pain but w/ noticeable challenge and fatigue. Pt continues to demonstrate gross LE weakness, and would benefit from continued therapy to address in order to enable pt return to running. [x]  See Progress Note/Recertification   Patient will continue to benefit from skilled PT services to modify and progress therapeutic interventions, address functional mobility deficits, address ROM deficits, address strength deficits, analyze and address soft tissue restrictions, analyze and cue movement patterns, analyze and modify body mechanics/ergonomics, assess and modify postural abnormalities and instruct in home and community integration to attain remaining goals. Progress toward goals / Updated goals:  1. Pt will demo good take-off and landing mechanics 6\" DL to DL and DL to SL box jumps to progress towards running progressed to TRX jump squat today 8/18/22  -8/26/22: added TRX split squat jumps  2. Improve R glute max strength to 5/5 for improve ability to get up from low surfaces 8/26/22: various exercises added today to address  3.  Improve FOTO score to > 75/100 to indicate improved function  4.  Pt will report pain < 4/10 after 3 consecutive 12 hour shifts to improve tolerance to standing        PLAN  [x]  Upgrade activities as tolerated yes Continue plan of care   []  Discharge due to :    []  Other:      Therapist: Bishnu Lord PT    Date: 8/26/2022 Time: 6:25 PM      Future Appointments   Date Time Provider Michelle Luna   8/26/2022 11:45 AM Dayana Trejo, PT [facility]

## 2022-09-09 ENCOUNTER — HOSPITAL ENCOUNTER (OUTPATIENT)
Dept: PHYSICAL THERAPY | Age: 22
Discharge: HOME | End: 2022-09-09
Payer: OTHER GOVERNMENT

## 2022-09-09 PROCEDURE — 97110 THERAPEUTIC EXERCISES: CPT

## 2022-09-09 PROCEDURE — 97530 THERAPEUTIC ACTIVITIES: CPT

## 2022-09-09 NOTE — PROGRESS NOTES
PHYSICAL THERAPY - DAILY TREATMENT NOTE    Patient Name: Radha Prasad        Date: 2022  : 2000   yes Patient  Verified  Visit #:     Insurance: Payor: VALERIO / Plan: Eric Fuentes 72 / Product Type:  /      In time: 7:05 Out time: 8:25   Total Treatment Time: 80     Medicare/BCBS Time Tracking (below)   Total Timed Codes (min): n/a 1:1 Treatment Time: n/a     TREATMENT AREA =  Pain in right hip [M25.551]    SUBJECTIVE  Pain Level (on 0 to 10 scale): 0  /10   Medication Changes/New allergies or changes in medical history, any new surgeries or procedures?    no  If yes, update Summary List   Subjective Functional Status/Changes:  []  No changes reported     I am having this really bad pain in my (R) Achilles tendon that I don't know if it from standing long hours at work or going to school or both.  But in the morning I tend to limp on it for a while before it feels a little bit better        OBJECTIVE  Modalities Rationale:     decrease inflammation and decrease pain to improve patient's ability to ambulate   min [] Estim, type/location:                                      []  att     []  unatt     []  w/US     []  w/ice    []  w/heat    min []  Mechanical Traction:                       min []  Ultrasound,      min                                                []  take home patch       []  in clinic   10 min [x]  Ice     []  Heat    location/position: R hip in prone with sheet wrapped around pelvis to hold ice to hip     min         min []  Other:    [x] Skin assessment post-treatment (if applicable):    [x]  intact    []  redness- no adverse reaction                  []redness - adverse reaction:      30 min Therapeutic Exercise:  [x]  See flow sheet:   Rationale:      increase ROM and increase strength to improve the patients ability to ambulate     40 min Therapeutic Activity: [x]  See flow sheet   Rationale:    increase strength and improve coordination to improve the patients ability to tolerate prolonged standing      Billed With/As:   [x] TE   [] TA   [] Neuro   [] Self Care Patient Education: [] Review HEP    [] Progressed/Changed HEP based on:   [] positioning   [] body mechanics   [] transfers   [] heat/ice application    [] other:     Other Objective/Functional Measures  - add DF kneeling stretch with opposite knee on foam with toe against wall, incline stretch and using the stick to roll out the calf prior to beginning of newly exercises and activities that were introduced last visit due to lack of power and strength with TXR single leg jumps and split squat jumps   Added single leg bridge with opposite LAQ     Post Treatment Pain Level (on 0 to 10) scale:   0 / 10     ASSESSMENT  Assessment/Changes in Function:     Patient was challenged with all new exercises and activities introduced previous session however some  may have been causing some increase discomfort and stress to the calf and Achilles, may tolerate jumps on total gym compare to jumps with TRX       [x]  See Progress Note/Recertification   Patient will continue to benefit from skilled PT services to modify and progress therapeutic interventions, address functional mobility deficits, address ROM deficits, address strength deficits, analyze and address soft tissue restrictions, analyze and cue movement patterns, analyze and modify body mechanics/ergonomics, assess and modify postural abnormalities and instruct in home and community integration to attain remaining goals. Progress toward goals / Updated goals:  1. Pt will demo good take-off and landing mechanics 6\" DL to DL and DL to SL box jumps to progress towards running progressed to TRX jump squat today 8/18/22  -8/26/22: added TRX split squat jumps  2. Improve R glute max strength to 5/5 for improve ability to get up from low surfaces 8/26/22: various exercises added today to address  3.  Improve FOTO score to > 75/100 to indicate improved function  4.  Pt will report pain < 4/10 after 3 consecutive 12 hour shifts to improve tolerance to standing        PLAN  [x]  Upgrade activities as tolerated yes Continue plan of care   []  Discharge due to :    []  Other:      Therapist: Marisol January, PTA    Date: 9/9/2022 Time: 6:25 PM      Future Appointments   Date Time Provider Michelle Luna   9/12/2022  8:00 AM Kelly Sales [facility]   9/19/2022  2:45 PM Christie Olivares PT [facility]   9/30/2022  7:30 AM Cherelle Hollins [facility]

## 2022-09-12 ENCOUNTER — HOSPITAL ENCOUNTER (OUTPATIENT)
Dept: PHYSICAL THERAPY | Age: 22
Discharge: HOME | End: 2022-09-12
Payer: OTHER GOVERNMENT

## 2022-09-12 PROCEDURE — 97110 THERAPEUTIC EXERCISES: CPT

## 2022-09-12 PROCEDURE — 97530 THERAPEUTIC ACTIVITIES: CPT

## 2022-09-12 NOTE — PROGRESS NOTES
PHYSICAL THERAPY - DAILY TREATMENT NOTE    Patient Name: Katlin Maurice        Date: 2022  : 2000   yes Patient  Verified  Visit #:     Insurance: Payor: VALERIO / Plan: Eric Fuentes 72 / Product Type:  /      In time: 8:00 Out time: 8:50   Total Treatment Time: 50     Medicare/BCBS Time Tracking (below)   Total Timed Codes (min): n/a 1:1 Treatment Time: n/a     TREATMENT AREA =  Pain in right hip [M25.551]    SUBJECTIVE  Pain Level (on 0 to 10 scale): 0  /10   Medication Changes/New allergies or changes in medical history, any new surgeries or procedures?    no  If yes, update Summary List   Subjective Functional Status/Changes:  []  No changes reported   Pt reports R hip has been mostly pain free, her R ankle continues to be painful and she has been wearing braces and a boot occasionally to help it heal.        OBJECTIVE  Modalities Rationale:     decrease inflammation and decrease pain to improve patient's ability to ambulate   min [] Estim, type/location:                                      []  att     []  unatt     []  w/US     []  w/ice    []  w/heat    min []  Mechanical Traction:                       min []  Ultrasound,      min                                                []  take home patch       []  in clinic   PD min [x]  Ice     []  Heat    location/position: R hip in prone with sheet wrapped around pelvis to hold ice to hip     min         min []  Other:    [x] Skin assessment post-treatment (if applicable):    [x]  intact    []  redness- no adverse reaction                  []redness - adverse reaction:      30 min Therapeutic Exercise:  [x]  See flow sheet:   Rationale:      increase ROM and increase strength to improve the patients ability to ambulate     20 min Therapeutic Activity: [x]  See flow sheet   Rationale:    increase strength and improve coordination to improve the patients ability to tolerate prolonged standing      Billed With/As:   [x] TE   [] TA   [] Neuro   [] Self Care Patient Education: [] Review HEP    [] Progressed/Changed HEP based on:   [] positioning   [] body mechanics   [] transfers   [] heat/ice application    [] other:     Other Objective/Functional Measures See PN     Post Treatment Pain Level (on 0 to 10) scale:   0 / 10     ASSESSMENT  Assessment/Changes in Function:   PT considering continuing with double leg box jumps to continue improving strength and power for return to running. See PN. Pt was educated re heel lift to offload achillis to allow for healing. [x]  See Progress Note/Recertification   Patient will continue to benefit from skilled PT services to modify and progress therapeutic interventions, address functional mobility deficits, address ROM deficits, address strength deficits, analyze and address soft tissue restrictions, analyze and cue movement patterns, analyze and modify body mechanics/ergonomics, assess and modify postural abnormalities and instruct in home and community integration to attain remaining goals.    Progress toward goals / Updated goals:  See PN        PLAN  [x]  Upgrade activities as tolerated yes Continue plan of care   []  Discharge due to :    []  Other:      Therapist: Jayesh Dudley    Date: 9/12/2022 Time: 6:25 PM      Future Appointments   Date Time Provider Michelle Luna   9/12/2022  8:00 AM Russell Cano [facility]   9/19/2022  2:45 PM Yaya Treviño PT [facility]   9/30/2022  7:30 AM [facility]

## 2022-09-12 NOTE — PROGRESS NOTES
[facility] PHYSICAL THERAPY  [facility], [place], [address] - Phone: [phone]  Fax: [phone]  PROGRESS NOTE  Patient Name: Lakesha Quintanilla : 2000   Treatment/Medical Diagnosis: Pain in right hip [M25.551]   Referral Source: Galilea Jiang MD     Date of Initial Visit: 2022 Attended Visits: 19 Missed Visits: 2     SUMMARY OF TREATMENT  Pt has attended 19 sessions of PT for the tx of R hip pain s/p R acetabular labral tear, rim recession, femoral ostochondroplasty, and capsular plication on 7228. PT tx has consisted of therex, theract, NMRE, manual therapy, and HEP instruction in order to improve ROM, mobility, flexibility, strength, stability, functional movement, and dec pain. CURRENT STATUS  Pt reports 90% overall improvement in sx since beginning care. Pt reports 3/10 max pain, rarely, intermittent with prolonged standingwalking;  0/10 avg pain. Improvements: balance, endurance, strength, improved tolerance for prolonged walking and consecutive work shifts. Remaining functional limitations: dec rotational ROM/flexibility, difficulty with lunges/floor transfers, single leg jumping     Objective Measurements:     AROM (degrees) Right   Hip     Flexion supine 119             Extension prone 15              ER prone 31             IR prone 40 \"tight\"     Strength:    Right (/5)   Hip     Flexion 5/5             Abduction 4+/5             Adduction 4+/5             Extension 4/5             ER 5/5             IR 4/5     Functional assessment:   Squat good mechanics but noticeable PPT at ~80 deg   Lateral tap down 6\" step\" mild genu valgum angulation on the R   SL bridge full range bilaterally  Double leg jump to 6\" box, grossly WNL, single leg jump to 6\" box dec height of jump, noted genu valgus  Lunge: jump lunge noted genu valgus on R, fatigue at 3 reps  FOTO 91 /100    Goal/Measure of Progress Goal Met? 1.    Pt will demo good take-off and landing mechanics 6\" DL to DL and DL to SL box jumps to progress towards running   Status at last Eval: Poor mechanics Current Status: Good mechanics with DL take off/landing, poor with single leg No, improving   2. Improve R glute max strength to 5/5 for improve ability to get up from low surfaces   Status at last Eval: 4-/5 Current Status: 4/5 No, progressing   3. Improve FOTO score to >/= 75/100 to indicate improved function   Status at last Eval: 74 Current Status: 91 Yes     4. Pt will report pain < 4/10 after 3 consecutive 12 hour shifts to improve tolerance to standing   Status at last Eval: 9/10 pain with consecutive 12 hour shifts Current Status: 3/10 max, rare and intermittent with 3 consecutive work shifts  met     New Goals to be achieved in __4__  weeks:  1. Pt will demo good take-off and landing mechanics 6\" SL to SL box jumps to progress towards running  2. Improve R glute max strength to 5/5 for improve ability to get up from low surfaces  3. Pt will demo ability to perform jump lunge 10x with good mechanics for floor transfers and return to running. RECOMMENDATIONS  Pt overall continues to progress well with PT with current greatest deficit in glute/hip strength, limiting power and endurance necessary for floor transfers and running. Skilled care is warranted to continue in order to continue pt progress with strength and flexilibity training to return her to PLOF. Continue with skilled care 1-2x per week for 4 weeks. If you have any questions/comments please contact us directly at [phone]. Thank you for allowing us to assist in the care of your patient.     Therapist Signature: Yvon RICO Date: 9/12/2022     Time: 1:48 PM   NOTE TO PHYSICIAN:  PLEASE COMPLETE THE ORDERS BELOW AND FAX TO   [facility]: [phone]  If you are unable to process this request in 24 hours please contact our office: [phone]    ___ I have read the above report and request that my patient continue as recommended.   ___ I have read the above report and request that my patient continue therapy with the following changes/special instructions:_________________________________________________________   ___ I have read the above report and request that my patient be discharged from therapy.      Physician Signature:        Date:       Time:       Marlene Abbott MD

## 2022-09-19 ENCOUNTER — APPOINTMENT (OUTPATIENT)
Dept: PHYSICAL THERAPY | Age: 22
End: 2022-09-19
Payer: OTHER GOVERNMENT

## 2022-09-27 ENCOUNTER — HOSPITAL ENCOUNTER (OUTPATIENT)
Dept: PHYSICAL THERAPY | Age: 22
Discharge: HOME | End: 2022-09-27
Payer: OTHER GOVERNMENT

## 2022-09-27 PROCEDURE — 97110 THERAPEUTIC EXERCISES: CPT

## 2022-09-27 PROCEDURE — 97530 THERAPEUTIC ACTIVITIES: CPT

## 2022-09-27 NOTE — PROGRESS NOTES
[address] [place] [address] PHYSICAL THERAPY  [facility], [place], [address] - Phone: [phone]  Fax: [phone]  DISCHARGE SUMMARY  Patient Name: Deepak Chun : 2000   Treatment/Medical Diagnosis: Pain in right hip [M25.551]   Referral Source: Sam Escobar MD     Date of Initial Visit: 22 Attended Visits: 20 Missed Visits: 2     SUMMARY OF TREATMENT  Pt has been seen for tx of R hip pain s/p R acetabular labral tear, rim recession, femoral ostochondroplasty, and capsular plication on 3/47/9335. PT tx has consisted of therex, theract, NMRE, manual therapy, and HEP instruction in order to improve ROM, mobility, flexibility, strength, stability, functional movement, and dec pain. CURRENT STATUS  Pt reports 99% overall improvement in sx since start of care. Notes max/average pain 0/10. Pt states she has not had pain to R hip for a long time. Pt injured her R ankle ~1 month ago; \"I think it's from overuse, but she doesn't think I have a tear\". Pt states she has had an xray (-) for fracture. Pt has been in CAM walker for 1 week. Improvements: Improved prolonged standing tolerance, no more pain with twisting movements (getting in/out of car or moving in bed), \"I feel more flexible now and not as tight\". Remaining Functional limitations: limited walking tolerance 2/2 R ankle pain, unable to run/jump 2/2 R ankle pain    Objective:   ROM:  Right Hip: (measured in prone) IR 50 (\"feels a little tight\"), ER 40  STRENGTH: Hip Ext 5-/5, Abd 5/5, ER/IR 5-/5        New Goals to be achieved in __4__  weeks:  1. Pt will demo good take-off and landing mechanics 6\" SL to SL box jumps to progress towards running  Status at last note/certification: : Good mechanics with DL take off/landing, poor with single leg  Current:  Goal Not MET: n/a 2/2 R ankle in CAM boot  2.  Improve R glute max strength to 5/5 for improve ability to get up from low surfaces  Status at last note/certification: 3/11: Glute max 4/5  Current:  9/27: Goal Progressing; 5-/5  3. Pt will demo ability to perform jump lunge 10x with good mechanics for floor transfers and return to running. Status at last note/certification: 1/47: jump lunge noted genu valgus on R, fatigue at 3 reps  Current:  Goal Not MET: n/a 2/2 R ankle in CAM boot    RECOMMENDATIONS  Discontinue therapy. Progressing towards or have reached established goals. At this time, further progress will be limited by R ankle pain as pt is in CAM boot and unable to participate in plyometric activities. Pt has been ed on appropriate HEP and is in agreement to d/c further PT at this time for her hip. If you have any questions/comments please contact us directly at [phone]. Thank you for allowing us to assist in the care of your patient. Therapist Signature: Alvaro Lam.  ZANE Knutson Date: 09/27/22      Time: 5:30 PM

## 2022-09-27 NOTE — PROGRESS NOTES
PHYSICAL THERAPY - DAILY TREATMENT NOTE    Patient Name: Jonatan Mohamud        Date: 2022  : 2000   yes Patient  Verified  Visit #:     Insurance: Payor:  / Plan: Eric Fuentes 74 / Product Type:  /      In time: 5:13 PM Out time: 5:46 PM   Total Treatment Time: 33     Medicare/BCBS Time Tracking (below)   Total Timed Codes (min): n/a 1:1 Treatment Time: n/a     TREATMENT AREA =  Pain in right hip [M25.551]    SUBJECTIVE  Pain Level (on 0 to 10 scale): 0  /10   Medication Changes/New allergies or changes in medical history, any new surgeries or procedures?    no  If yes, update Summary List   Subjective Functional Status/Changes:  []  No changes reported   See d/c       OBJECTIVE    15 min Therapeutic Exercise:  [x]  See flow sheet:   Rationale:      increase ROM and increase strength to improve the patients ability to ambulate     18 min Therapeutic Activity: [x]  See flow sheet   Rationale:    increase strength and improve coordination to improve the patients ability to tolerate prolonged standing      Billed With/As:   [x] TE   [] TA   [] Neuro   [] Self Care Patient Education: [] Review HEP    [] Progressed/Changed HEP based on:   [] positioning   [] body mechanics   [] transfers   [] heat/ice application    [] other:     Other Objective/Functional Measures   Updated HEP     Post Treatment Pain Level (on 0 to 10) scale:   0 / 10     ASSESSMENT  Assessment/Changes in Function:   See d/c           Progress toward goals / Updated goals:  See d/c        PLAN  [x]  Upgrade activities as tolerated no Continue plan of care   []  Discharge due to :    []  Other:      Therapist: Lisa Shah PT    Date: 2022 Time: 5:55 PM      Future Appointments   Date Time Provider Michelle Luna   2022  5:15 PM Chepe Castillo, PT [facility]

## 2022-09-30 ENCOUNTER — APPOINTMENT (OUTPATIENT)
Dept: PHYSICAL THERAPY | Age: 22
End: 2022-09-30
Payer: OTHER GOVERNMENT

## 2022-10-04 ENCOUNTER — HOSPITAL ENCOUNTER (OUTPATIENT)
Dept: PHYSICAL THERAPY | Age: 22
Discharge: HOME | End: 2022-10-04
Payer: OTHER GOVERNMENT

## 2022-10-04 PROCEDURE — 97110 THERAPEUTIC EXERCISES: CPT

## 2022-10-04 PROCEDURE — 97140 MANUAL THERAPY 1/> REGIONS: CPT

## 2022-10-04 PROCEDURE — 97162 PT EVAL MOD COMPLEX 30 MIN: CPT

## 2022-10-04 PROCEDURE — 97035 APP MDLTY 1+ULTRASOUND EA 15: CPT

## 2022-10-04 NOTE — PROGRESS NOTES
[address] [place] [address] PHYSICAL THERAPY  [facility], [place], [address] - Phone: [phone]  Fax: [phone]  Request for use of Dry Needling/Intramuscular Manual Therapy  Patient Name: Gabe Rey : 2000   Treatment/Medical Diagnosis: Achilles tendinitis, right leg [M76.61]   Referral Source: Rockland Fleischer*     Date of Initial Visit: 10/4/22 Attended Visits: 1 Missed Visits: 0     Based on findings from the physical therapy examination and evaluation, the evaluating therapist believes the patient, Gabe Rey would benefit from including Dry Needling as part of the plan of care. Dry needling is a treatment technique utilized in conjunction with other PT interventions to inactivate myofascial trigger points and the pain and dysfunction they cause. Dry Needling is an advanced procedure that requires additional training of intensive course work. PROCEDURE:          -  Solid filament sterile needle (typically 0.3mm/30 gauge) inserted into a trigger point          -  Repeated movements inactivate the trigger points, taking 30-60 seconds per site  Typically consists of 1 dry needling session per week and a possible second treatment including muscle re-education, flexibility, strengthening and other manual techniques to facilitate the benefits of dry needling  BENEFITS:  Inactivation of trigger points  Decreased pain  Increased muscle length  Improved movement patterns  Restoration of function POTENTIAL RISKS:  Post-needling soreness  Infection  Bruising/bleeding  Penetration of a nerve  Pneumothorax  All treating PTs have been thoroughly educated in avoiding adverse reactions   If you agree with this recommendation, please sign the attached prescription form and fax it to us at [phone]. If you have questions or concerns regarding dry needling or any other treatment we may be providing, please contact us at [phone].   Thank you for allowing us to assist in the care of your patient. Therapist Signature: Camden Fernandes DPT Date: 10/4/2022     Time: 4:51 PM   NOTE TO PHYSICIAN:  PLEASE COMPLETE THE ORDERS BELOW AND FAX TO   [facility]: [phone]  If you are unable to process this request in 24 hours please contact our office: [phone]  Check box     I have read the above request and AGREE to the recommendation of including dry needling as part of the plan of care. I have read the above request and DO NOT AGREE to including dry needling as part of the plan of care.     I have read the above report and request that my patient continue therapy with the following changes/special instructions: _________________________________________________     Physician Signature:        Date:       Time:       Jenae Jacinto

## 2022-10-04 NOTE — PROGRESS NOTES
PT DAILY TREATMENT NOTE     Patient Name: Teressa Booker  Date:10/4/2022  : 2000  [x]  Patient  Verified  Payor: VALERIO / Plan: Eric Fuentes 72 / Product Type: Sabra Bautista /    In time:3:50  Out time:4:45  Total Treatment Time (min): 55  Visit #: 1 of     Medicare/BCBS Only   Total Timed Codes (min):  30 1:1 Treatment Time:  55       Treatment Area: Achilles tendinitis, right leg [M76.61]    SUBJECTIVE  Pain Level (0-10 scale): 5  Any medication changes, allergies to medications, adverse drug reactions, diagnosis change, or new procedure performed?: [x] No    [] Yes (see summary sheet for update)  Subjective functional status/changes:   [] No changes reported  Pt initial eval see POC for details    OBJECTIVE    Modality rationale: decrease pain to improve the patient's ability to tolerate ADLs   Min Type Additional Details    [] Estim:  []Unatt       []IFC  []Premod                        []Other:  []w/ice   []w/heat  Position:  Location:    [] Estim: []Att    []TENS instruct  []NMES                    []Other:  []w/US   []w/ice   []w/heat  Position:  Location:    []  Traction: [] Cervical       []Lumbar                       [] Prone          []Supine                       []Intermittent   []Continuous Lbs:  [] before manual  [] after manual   8 [x]  Ultrasound: [x]Continuous   [] Pulsed                           []1MHz   [x]3MHz W/cm2: 1.5  Location: R achvivi    []  Iontophoresis with dexamethasone         Location: [] Take home patch   [] In clinic    []  Ice     []  heat  []  Ice massage  []  Laser   []  Anodyne Position:  Location:    []  Laser with stim  []  Other:  Position:  Location:    []  Vasopneumatic Device Pressure:       [] lo [] med [] hi   Temperature: [] lo [] med [] hi   [x] Skin assessment post-treatment:  [x]intact []redness- no adverse reaction    []redness - adverse reaction:     25 min [x]Eval                  []Re-Eval       8 min Therapeutic Exercise:  [] See flow sheet :   Rationale: increase ROM and increase strength to improve the patient's ability to walk    14 min Manual Therapy:  R achillies bend/lift, trigger point release to soleus and gastroc, manual gastroc and soleus stretching   The manual therapy interventions were performed at a separate and distinct time from the therapeutic activities interventions. Rationale: decrease pain, increase ROM and increase tissue extensibility to improve tissue excursion during functional mobility and ADLs        With   [] TE   [] TA   [] neuro   [] other: Patient Education: [x] Review HEP    [] Progressed/Changed HEP based on:   [] positioning   [] body mechanics   [] transfers   [] heat/ice application    [] other:      Other Objective/Functional Measures:    Justification for Eval Code Complexity:  Patient History : see POC   Examination see exam   Clinical Presentation: evolving  Clinical Decision Making : FOTO : 50/100    Pain Level (0-10 scale) post treatment: 5    ASSESSMENT/Changes in Function: Pt was instructed in initial HEP required demo, vc, and tc for all exercises to perform correctly. Pt was given hand out detailing exercises and instructed in modification of exercises to tolerance, and in performing exercises safely. Pt verbalized understanding. Patient will continue to benefit from skilled PT services to modify and progress therapeutic interventions, address functional mobility deficits, address ROM deficits, address strength deficits, analyze and address soft tissue restrictions, analyze and cue movement patterns, analyze and modify body mechanics/ergonomics, assess and modify postural abnormalities, address imbalance/dizziness and instruct in home and community integration to attain remaining goals.      [x]  See Plan of Care  []  See progress note/recertification  []  See Discharge Summary         Progress towards goals / Updated goals:  No progress as goals were set today    PLAN  []  Upgrade activities as tolerated     []  Continue plan of care  []  Update interventions per flow sheet       []  Discharge due to:_  []  Other:_      Alysia Sport 10/4/2022  12:39 PM    Future Appointments   Date Time Provider Michelle Luna   10/4/2022  3:45 PM Chun Sales [facility]

## 2022-10-04 NOTE — PROGRESS NOTES
[address] [facility] AT [address], [address] [place] [address] Road  Phone: [phone]   [phone]  PLAN OF CARE / [address] [place] [address] PHYSICAL THERAPY SERVICES  Patient Name: Jonatan Mohamud : 2000   Medical   Diagnosis: Achilles tendinitis, right leg [M76.61] Treatment Diagnosis: R ankle pain [M 79.671]   Onset Date: ~1 month ago     Referral Source: Willie Marcus Start of [facility]): 10/4/2022   Prior Hospitalization: See medical history Provider #: [id number]   Prior Level of Function: I in all functional mobility  and ADLs active   Comorbidities: Arthritis, alcohol use   Medications: Verified on Patient Summary List   The Plan of Care and following information is based on the information from the initial evaluation.   =======================================================================================  Assessment / key information:  Pt is a 23 y.o. F who presents with R ankle pain, signs and sx are consistent with dx of achillis tendonitis. Current deficits include: dec ankle ROM, dec ankle strength, dec dynamic ankle stability Functional deficits include: impaired walking, standing on toes, AM transfers. FOTO score indicates 50% functional impairment. Home exercise program initiated on initial evaluation to address these deficits. Pt would benefit from PT to address these deficits for increased functional mobility and QOL. SUDARSHAN: Pt reports she began having pain in her R ankle for about a month. 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 She saw her MD who did x rays which she reports showed no bone spur and was diagnosed with achilles tendonitis. She was placed in a cam boot,which aids with ambulation, and she will also use a brace. Without a boot or brace the ankle is very painful.  She describes the pain as intermittent sharp pain that is localized to the achillis tendon. The pain is worse in the morning and at night. In the morning it is incredibly painful when she initially stands on it. It is difficult to lift her foot, and with the boot she is limited to walking for ~1 hour, without the boot she has immediate pain with walking. She is also not able to do single leg heel raises and feels limited in double leg heel raises like the R heel doesn't go as high as the L, both are painful. It is also very tender to touch. The pain in the ankle is interfering with her ability to do her job which typically involves prolonged walking and standing. The pain is better with wearing the boot, taking naproxen 2x per day, and she has tried icing but it doesn't seem to help. She has also tried to rest it but that seems to make it more sore. Pt denies all red flags. PAIN:  Location- R ankle  Current-5/10  Best-0/10  Worst- 8/10  Alleviating factors: naproxen, wearing boot  Aggravating factors: walking, standing on it in AM, standing on toes    OBJECTIVE:  MMTs:  ANKLE  - plantarflexion L= 3/5 R= 3-/5p! ( able to complete 5x DL heel raise, dec height on R, unable to perform SLS heel raise on R)  - dorsiflexion L= 4+/5 R= 3/5 p!  - inversion L= 4+/5 R= 3/5  - eversion L= 4+/5 R= 3/5p! Balance: SLS WNL lee, noted inc postural sway on RLE, noted lee pes planus and calcaneal eversion    Gait: mild shortened stride length on R, dec toe off on R,     AROM:  Plantarflexion 50 deg  Dorsiflexion 0 deg p!   Inversion 39 deg  Eversion 11 deg  great toe extension 75 deg    Swelling: figure 8 R= 55.1 cm, L= 54.1 cm    Outcome Measure: FOTO=50    Palpation for tenderness: TTP to R achillis tendon, tendon attachment at calcaneus, soleus, and gastroc, noted trigger points and taut bands throughout gastroc and soleus.   =====================================================================================  Eval Complexity: History MEDIUM  Complexity : 1-2 comorbidities / personal factors will impact the outcome/ POC ;  Examination  MEDIUM Complexity : 3 Standardized tests and measures addressing body structure, function, activity limitation and / or participation in recreation ; Presentation MEDIUM Complexity : Evolving with changing characteristics ; Decision Making MEDIUM Complexity : FOTO score of 26-74; Overall Complexity MEDIUM  Problem List: pain affecting function, decrease ROM, decrease strength, edema affecting function, impaired gait/ balance, decrease ADL/ functional abilitiies, decrease activity tolerance, decrease flexibility/ joint mobility, and decrease transfer abilities   Treatment Plan may include any combination of the following: Therapeutic exercise, Therapeutic activities, Neuromuscular re-education, Physical agent/modality, Gait/balance training, Manual therapy, Patient education, Self Care training, Functional mobility training, Home safety training, Stair training, and Other: dry needling to address tissue dysfunction  Patient / Family readiness to learn indicated by: asking questions  Persons(s) to be included in education: patient (P)  Barriers to Learning/Limitations: None  Measures taken, if barriers to learning:    Patient Goal (s): \"Pain free and increased ROM\"   Patient self reported health status: good  Rehabilitation Potential: good    Short Term Goals: To be accomplished in 1 weeks:  1)Patient will be independent and compliant with HEP in order to progress toward long term goals. Status at last note/certification: issued and reviewed  Long Term Goals: To be accomplished in 8-12 treatments:  1)Patient will improve FOTO assessment score to 74 pts in order to indicate improved functional abilities. Status at last note/certification: 87ZLD  2) Patient will improve R ankle DF to 10 deg or more for proper walking mechanics  Status at last note/certification: 0 deg  3)Patient will report worst R ankle pain as 2/10 or less in order to progress toward personal goals.   Status at last note/certification: 2/51  4)Patient will report overall at least 75% improvement in function in order to progress toward premorbid status. Status at last note/certification: n/a  5) Patient will report ability to walk/stand for 4+ hours without CAM boot to perform work duties  Status at last note/certification: 1 hour walking with CAM boot  6) Patient will demonstrate >=4/5 strength in R calf for improved ability to perform ADLs and ambulation  Status at last note/certification: R= 3-/5, unable to SLS heel raise    Frequency / Duration:   Patient to be seen  1-2  times per week for 4-6  weeks: All LTG as above will be assessed and updated every 10 visits or 30 days and progressed as needed    Patient / Caregiver education and instruction: exercises  Therapist Signature: Christina Hernandez Date: 27/1/5688   Certification Period: N/a Time: 12:39 PM   ========================================================================================  I certify that the above Physical Therapy Services are being furnished while the patient is under my care. I agree with the treatment plan and certify that this therapy is necessary. Physician Signature:        Date:       Time:        Guadalupe Friends*  Please sign and return to [facility] or you may fax the signed copy to [phone]. Thank you.

## 2022-10-11 ENCOUNTER — HOSPITAL ENCOUNTER (OUTPATIENT)
Dept: PHYSICAL THERAPY | Age: 22
Discharge: HOME | End: 2022-10-11
Payer: OTHER GOVERNMENT

## 2022-10-11 PROCEDURE — 97035 APP MDLTY 1+ULTRASOUND EA 15: CPT

## 2022-10-11 PROCEDURE — 97140 MANUAL THERAPY 1/> REGIONS: CPT

## 2022-10-11 PROCEDURE — 97110 THERAPEUTIC EXERCISES: CPT

## 2022-10-11 NOTE — PROGRESS NOTES
PHYSICAL THERAPY - DAILY TREATMENT NOTE    Patient Name: Bri Lopez        Date: 10/11/2022  : 2000   yes Patient  Verified  Visit #:   2   of     Insurance: Payor: VALERIO / Plan: Cherylene Leas / Product Type:  /      In time: 1:20 Out time: 2:12   Total Treatment Time: 52     Medicare/BCBS Time Tracking (below)   Total Timed Codes (min):   1:1 Treatment Time:       TREATMENT AREA =  Pain in right foot [M79.671]    SUBJECTIVE  Pain Level (on 0 to 10 scale):  6  / 10   Medication Changes/New allergies or changes in medical history, any new surgeries or procedures?    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           OBJECTIVE  Modalities Rationale:     decrease inflammation and decrease pain to improve patient's ability to improve functional abilities    min [] Estim, type/location:                                      []  att     []  unatt     []  w/US     []  w/ice    []  w/heat    min []  Mechanical Traction: type/lbs                   []  pro   []  sup   []  int   []  cont    []  before manual    []  after manual   8 min [x]  Ultrasound, settings/location:  Cont 1.0 w/cm2 to R distal Achilles insertion     min []  Iontophoresis w/ dexamethasone, location:                                               []  take home patch       []  in clinic    min []  Ice     []  Heat    location/position:     min []  Vasopneumatic Device, press/temp:    If using vaso (only need to measure limb vaso being performed on)      pre-treatment girth :       post-treatment girth :       measured at (landmark location) :    Vasopnuematic compression justification:  Per bilateral girth measures taken and listed above the edema is considered significant and having an impact on the patient's     min []  Other:    [x] Skin assessment post-treatment (if applicable):    [x]  intact    [x]  redness- no adverse reaction                  []redness - adverse reaction:      34 min Therapeutic Exercise:  see flow sheet   Rationale: increase ROM, increase strength, improve coordination, improve balance, and increase proprioception to improve the patients ability to progress to functional activities limited by pain or other dysfunction     10 min Manual Therapy: Graston Therapy/IAMT to R calf/Achilles using GT-1&2   Rationale: decrease pain, increase ROM, increase tissue extensibility, and decrease trigger points to improve functional mobility   The manual therapy interventions were performed at a separate and distinct time from the therapeutic activities interventions    Billed With/As:   [] TE   [] TA   [] Neuro   [] Self Care Patient Education: [x] Review HEP    [] Progressed/Changed HEP based on:   [] positioning   [] body mechanics   [] transfers   [] heat/ice application    [] other:      Other Objective/Functional Measures:  Verbal cueing/demonstration for proper form/technique with various exercises/activities during treatment. 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 Pt also presented with increased tenderness in distal Achilles insertion with manual intervention today. Pt needs the most focus on calf flexibility as well as calf and intrinsic foot strengthening. Will continue to progress/advance within current POC with monitoring symptoms as tolerated.        []  See Progress Note/Recertification   Patient will continue to benefit from skilled PT services to modify and progress therapeutic interventions, address functional mobility deficits, address ROM deficits, address strength deficits, analyze and address soft tissue restrictions, analyze and cue movement patterns, and instruct in home and community integration to attain remaining goals. Progress toward goals / Updated goals:  Short Term Goals: To be accomplished in 1 weeks:  1)Patient will be independent and compliant with HEP in order to progress toward long term goals. Status at last note/certification: issued and reviewed 10/11/22: Met, Pt reports independence and compliance with current HEP since initial evaluation last treatment  Long Term Goals: To be accomplished in 8-12 treatments:  1)Patient will improve FOTO assessment score to 74 pts in order to indicate improved functional abilities. Status at last note/certification: 94YTH  2) Patient will improve R ankle DF to 10 deg or more for proper walking mechanics  Status at last note/certification: 0 deg  3)Patient will report worst R ankle pain as 2/10 or less in order to progress toward personal goals. Status at last note/certification: 4/55  4)Patient will report overall at least 75% improvement in function in order to progress toward premorbid status.   Status at last note/certification: n/a  5) Patient will report ability to walk/stand for 4+ hours without CAM boot to perform work duties  Status at last note/certification: 1 hour walking with CAM boot  6) Patient will demonstrate >=4/5 strength in R calf for improved ability to perform ADLs and ambulation  Status at last note/certification: R= 3-/5, unable to SLS heel raise     PLAN  [x]  Upgrade activities as tolerated yes Continue plan of care   []  Discharge due to :    []  Other:      Therapist: Ellie Ball PTA    Date: 10/11/2022 Time: 1:22 PM     Future Appointments   Date Time Provider Michelle Luna   10/18/2022  2:45 PM Jaime Saba PTA [facility]   10/20/2022  2:00 PM Henrry Chun Leslie [facility]   10/25/2022  2:15 PM Denice Loco [address] [facility]   11/1/2022  2:00 PM Dustin Husbands, PTA [facility]   11/3/2022  2:45 PM Macey Brito, PT [facility]   11/8/2022  2:00 PM Dustin Eastons, PTA [facility]   11/10/2022  2:00 PM Greta LUCAS, PT [facility]   11/15/2022  2:00 PM Denice Razoa [facility]   11/17/2022  2:00 PM Ceprobbs Dunga [facility]

## 2022-10-18 ENCOUNTER — APPOINTMENT (OUTPATIENT)
Dept: PHYSICAL THERAPY | Age: 22
End: 2022-10-18
Payer: OTHER GOVERNMENT

## 2022-10-20 ENCOUNTER — HOSPITAL ENCOUNTER (OUTPATIENT)
Dept: PHYSICAL THERAPY | Age: 22
Discharge: HOME | End: 2022-10-20
Payer: OTHER GOVERNMENT

## 2022-10-20 PROCEDURE — 97140 MANUAL THERAPY 1/> REGIONS: CPT

## 2022-10-20 PROCEDURE — 97110 THERAPEUTIC EXERCISES: CPT

## 2022-10-20 NOTE — PROGRESS NOTES
PHYSICAL THERAPY - DAILY TREATMENT NOTE    Patient Name: Viridiana Gerber        Date: 10/20/2022  : 2000   yes Patient  Verified  Visit #:   3   of     Insurance: Payor: VALERIO / Plan: Eric Fuentes 74 / Product Type:  /      In time: 2:00 Out time: 2:48   Total Treatment Time: 48     Medicare/BCBS Time Tracking (below)   Total Timed Codes (min):   1:1 Treatment Time:       TREATMENT AREA =  Pain in right foot [M79.671]    SUBJECTIVE  Pain Level (on 0 to 10 scale): 2  / 10   Medication Changes/New allergies or changes in medical history, any new surgeries or procedures?    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 She wears a heel lift in her shoe when she is on clinicals.           OBJECTIVE  Modalities Rationale:     decrease inflammation and decrease pain to improve patient's ability to improve functional abilities    min [] Estim, type/location:                                      []  att     []  unatt     []  w/US     []  w/ice    []  w/heat    min []  Mechanical Traction: type/lbs                   []  pro   []  sup   []  int   []  cont    []  before manual    []  after manual   x min [x]  Ultrasound, settings/location:  Cont 1.0 w/cm2 to R distal Achilles insertion     min []  Iontophoresis w/ dexamethasone, location:                                               []  take home patch       []  in clinic    min []  Ice     []  Heat    location/position:     min []  Vasopneumatic Device, press/temp:    If using vaso (only need to measure limb vaso being performed on)      pre-treatment girth :       post-treatment girth :       measured at (landmark location) :    Vasopnuematic compression justification:  Per bilateral girth measures taken and listed above the edema is considered significant and having an impact on the patient's     min []  Other:    [x] Skin assessment post-treatment (if applicable):    [x]  intact    [x]  redness- no adverse reaction                  []redness - adverse reaction:      38 min Therapeutic Exercise:  see flow sheet   Rationale: increase ROM, increase strength, improve coordination, improve balance, and increase proprioception to improve the patients ability to progress to functional activities limited by pain or other dysfunction     10 min Manual Therapy: IASTM to R calf/Achilles    Rationale: decrease pain, increase ROM, increase tissue extensibility, and decrease trigger points to improve functional mobility   The manual therapy interventions were performed at a separate and distinct time from the therapeutic activities interventions    Billed With/As:   [] TE   [] TA   [] Neuro   [] Self Care Patient Education: [x] Review HEP    [] Progressed/Changed HEP based on:   [] positioning   [] body mechanics   [] transfers   [] heat/ice application    [] other:      Other Objective/Functional Measures: Added resisted great toe flexion  Added pro stretch   Added soleus heel raise       Post Treatment Pain Level (on 0 to 10) scale:   2  / 10     ASSESSMENT  Assessment/Changes in Function:   Pt reported no inc in sx with new exercises today. Pt required vc and demo for all new exercises to perform correctly. Pt HEP was updated to reflect exercises from today with intention of continuing to inc functional strength and ROM. Pt was also instructed in self IASTM I.e. utilizing blunt edge of butter knife at 45 deg angle gently scraping toward and away from tendon for improved ability to self manage sx and to halt if pain or adverse sx occurred. Pt was instructed to inc walking on off clinic days to continue to build resilience in tendon. Pt verbalized understanding.       []  See Progress Note/Recertification   Patient will continue to benefit from skilled PT services to modify and progress therapeutic interventions, address functional mobility deficits, address ROM deficits, address strength deficits, analyze and address soft tissue restrictions, analyze and cue movement patterns, and instruct in home and community integration to attain remaining goals. Progress toward goals / Updated goals:  Short Term Goals: To be accomplished in 1 weeks:  1)Patient will be independent and compliant with HEP in order to progress toward long term goals. Status at last note/certification: issued and reviewed 10/11/22: Met, Pt reports independence and compliance with current HEP since initial evaluation last treatment, updated 10/20/22  Long Term Goals: To be accomplished in 8-12 treatments:  1)Patient will improve FOTO assessment score to 74 pts in order to indicate improved functional abilities. Status at last note/certification: 02TNI  2) Patient will improve R ankle DF to 10 deg or more for proper walking mechanics  Status at last note/certification: 0 deg  3)Patient will report worst R ankle pain as 2/10 or less in order to progress toward personal goals. Status at last note/certification: 6/41  4)Patient will report overall at least 75% improvement in function in order to progress toward premorbid status.   Status at last note/certification: n/a  5) Patient will report ability to walk/stand for 4+ hours without CAM boot to perform work duties  Status at last note/certification: 1 hour walking with CAM boot  6) Patient will demonstrate >=4/5 strength in R calf for improved ability to perform ADLs and ambulation  Status at last note/certification: R= 3-/5, unable to SLS heel raise     PLAN  [x]  Upgrade activities as tolerated yes Continue plan of care   []  Discharge due to :    []  Other:      Therapist: Joe Morgan    Date: 10/20/2022 Time: 1:22 PM     Future Appointments   Date Time Provider Michelle Luna   10/20/2022  2:00 PM Caitie Priest [address] [facility]   10/25/2022  2:15 PM Henrry Kosta [place] [facility]   11/1/2022  2:00 PM Anthony Ordonez PTA [facility]   11/3/2022  2:45 PM Eva Ma, PT [facility]   11/8/2022  2:00 PM Anthony Ordonez PTA [facility]   11/10/2022  2:00 PM Roger LUCAS, PT [facility]   11/15/2022  2:00 PM Albina Luna [facility]   11/17/2022  2:00 PM Albina Luna [facility]

## 2022-10-25 ENCOUNTER — HOSPITAL ENCOUNTER (OUTPATIENT)
Dept: PHYSICAL THERAPY | Age: 22
End: 2022-10-25
Payer: OTHER GOVERNMENT

## 2022-11-01 ENCOUNTER — HOSPITAL ENCOUNTER (OUTPATIENT)
Dept: PHYSICAL THERAPY | Age: 22
End: 2022-11-01
Payer: OTHER GOVERNMENT

## 2022-11-03 ENCOUNTER — HOSPITAL ENCOUNTER (OUTPATIENT)
Dept: PHYSICAL THERAPY | Age: 22
Discharge: HOME | End: 2022-11-03
Payer: OTHER GOVERNMENT

## 2022-11-03 PROCEDURE — 97112 NEUROMUSCULAR REEDUCATION: CPT

## 2022-11-03 PROCEDURE — 97530 THERAPEUTIC ACTIVITIES: CPT

## 2022-11-03 PROCEDURE — 97140 MANUAL THERAPY 1/> REGIONS: CPT

## 2022-11-03 PROCEDURE — 97110 THERAPEUTIC EXERCISES: CPT

## 2022-11-03 NOTE — PROGRESS NOTES
PHYSICAL THERAPY - DAILY TREATMENT NOTE    Patient Name: Shahram Reynoso        Date: 11/3/2022  : 2000   yes Patient  Verified  Visit #:   4     Insurance: Payor: VALERIO / Plan: Eric Fuentes 74 / Product Type:  /      In time: 2:39 Out time: 3:20   Total Treatment Time: 41     Medicare/BCBS Time Tracking (below)   Total Timed Codes (min):  na 1:1 Treatment Time:  na     TREATMENT AREA =  Pain in right foot [M79.671]    SUBJECTIVE  Pain Level (on 0 to 10 scale):  0  / 10   Medication Changes/New allergies or changes in medical history, any new surgeries or procedures?    no  If yes, update Summary List   Subjective Functional Status/Changes:  []  No changes reported     \"I've been feeling really good.  The only thing I feel now is stiffness in the morning and at night, after I've been sitting for a long period of time\"          OBJECTIVE  17 min Therapeutic Exercise:  see flow sheet   Rationale: increase ROM, increase strength, improve coordination, improve balance, and increase proprioception to improve the patients ability to progress to functional activities limited by pain or other dysfunction     8 min Therapeutic Activity:  see flow sheet   Rationale: to improve functional activities, model real life movements and performance specific to the patients need with supervision to return the patient to their PLOF      8 min Neuromuscular Re-education:  see flow sheet   Rationale: exercises designed to improve and/or maintain balance, coordination, kinesthetic sense, posture, and/or proprioception for functional activities to improve the patients ability to function in daily life    8 min Manual Therapy: IASTM to R achilles; DTM to R gastroc/soleus; gastroc/soleus stretch in prone   Rationale: decrease pain, increase ROM, increase tissue extensibility, and decrease trigger points to tolerate prolonged ambulation  The manual therapy interventions were performed at a separate and distinct time from the therapeutic activities interventions    Billed With/As:   [] TE   [x] TA   [] Neuro   [] Self Care Patient Education: [x] Review HEP    [x] Progressed/Changed HEP based on: added eccentric SL HR at step  [] positioning   [] body mechanics   [] transfers   [] heat/ice application    [] other:      Other Objective/Functional Measures:    See DC summary     Post Treatment Pain Level (on 0 to 10) scale:   0  / 10     ASSESSMENT  Assessment/Changes in Function:     See DC summary     []  See Progress Note/Recertification   Patient will continue to benefit from skilled PT services to n/a to attain remaining goals.    Progress toward goals / Updated goals:    See DC summary     PLAN  []  Upgrade activities as tolerated no Continue plan of care   [x]  Discharge due to : Goals met, program complete   []  Other:      Therapist: Uli Zhao PT    Date: 11/3/2022 Time: 12:35 PM     Future Appointments   Date Time Provider Michelle Luna   11/3/2022  2:45 PM Maritza Montemayor, PT [facility]   11/8/2022  2:00 PM Hilary Sweet PTA [facility]   11/10/2022  2:00 PM Maritza Montemayor PT [facility]   11/15/2022  2:00 PM Vic Hernandes [facility]   11/17/2022  2:00 PM Vic Hernandes [facility]

## 2022-11-03 NOTE — PROGRESS NOTES
Physical Therapy Discharge Instructions  [address]  [address] [place] [address]  P: [phone] F: [phone]    Patient: Magaly Gastelum  : 2000    Reason for Discharge From PT:  [x]Met/progressing towards all set goals    []Minimal progress made towards set goals    []Met a plateau in progress/improvement    []Insurance/financial issues    []Other:     Recommendations:   [x] Return to activity with home program as prescribed on print-outs    [] Return to activity with the following modifications:     [] [employer] fitness training     [] Return to/join local gym    [] Other:      Continue with      [x] Ice [] Heat   as needed for 10-15 minutes to relieve pain  *If pain does not improve after several days, follow-up with your physician for a consult*           Follow up with MD:     [] Upon completion of therapy   [x] As needed      Additional Comments: great job!!! Thank you for choosing [facility] [place] for your PT needs!       [address] [place] [address], PT 11/3/2022 3:04 PM

## 2022-11-03 NOTE — PROGRESS NOTES
[address] [place] [address] PHYSICAL THERAPY  [facility], [place], [address] - Phone: [phone]  Fax: [phone]  DISCHARGE SUMMARY  Patient Name: Shahram Reynoso : 2000   Treatment/Medical Diagnosis: Pain in right foot [M79.671]   Referral Source: Micaela Whelancaden     Date of Initial Visit: 10/4/2022 Attended Visits: 4 Missed Visits: 2     SUMMARY OF TREATMENT  Pt has attended 4 sessions of PT for the tx of R achilles tendinitis. PT tx has consisted of therapeutic exercise/activity, neuromuscular re-ed, manual therapy, modalities prn, and HEP instruction to improve strength/stability to R ankle, balance/proprioception, LE flexibility, gait, and dec pain. CURRENT STATUS  Pt reports 95% overall improvement in sx since beginning care. Pt reports 0/10 max pain, 0/10 avg pain. Pt's chief complaint is tightness in the AM and PM; appear to be related to prolonged sedentary periods. Improvements: absent pain, improved walking/standing tolerance, squatting  Remaining functional limitations: stiffness after prolonged sedentary periods. Objective Measurements:  AROM R ankle: DF 3, PF 68, IV 37, EV 8; Hallux ext R 90    Gastroc flexibility -4 deg    MMT R ankle: DF 5/5, PF 4/5 (14 reps), IV 5/5, EV 5/5    Squat to 80 deg w/ symmetrical WB and no heel lift  Lateral tap down good technique, eccentric loading  Gait normal/symmetric    SLS (foam) R 30\"    Figure 8 R 53.5 cm    FOTO 99/100, GROC +7    Goal/Measure of Progress Goal Met? 1. Patient will improve FOTO assessment score to 74 pts in order to indicate improved functional abilities. Status at last Eval: 50 Current Status: 99/100 yes   2. Patient will improve R ankle DF to 10 deg or more for proper walking mechanics   Status at last Eval: 0 Current Status: 3 progress   3.   Patient will report worst R ankle pain as 2/10 or less in order to progress toward personal goals   Status at last Eval: 8/10 Current Status: 0/10 yes     4. Patient will report overall at least 75% improvement in function in order to progress toward premorbid status. Status at last Eval: N/a Current Status: 95% yes   5. Patient will report ability to walk/stand for 4+ hours without CAM boot to perform work duties   Status at last Eval: 1 hour in CAM boot Current Status: Unlimited standing/walking tolerance out of boot yes   6. Patient will demonstrate >=4/5 strength in R calf for improved ability to perform ADLs and ambulation   Status at last Eval: R 3-/5 Current Status: 4/5 yes     RECOMMENDATIONS  Discontinue therapy; reached or progressing towards all goals. DC to HEP. If you have any questions/comments please contact us directly at [phone]. Thank you for allowing us to assist in the care of your patient.     Therapist Signature: Dora Benitez, PT Date: 11/3/2022     Time: 12:36 PM

## 2022-11-08 ENCOUNTER — APPOINTMENT (OUTPATIENT)
Dept: PHYSICAL THERAPY | Age: 22
End: 2022-11-08
Payer: OTHER GOVERNMENT

## 2022-11-10 ENCOUNTER — APPOINTMENT (OUTPATIENT)
Dept: PHYSICAL THERAPY | Age: 22
End: 2022-11-10
Payer: OTHER GOVERNMENT

## 2022-11-15 ENCOUNTER — APPOINTMENT (OUTPATIENT)
Dept: PHYSICAL THERAPY | Age: 22
End: 2022-11-15
Payer: OTHER GOVERNMENT

## 2022-11-17 ENCOUNTER — APPOINTMENT (OUTPATIENT)
Dept: PHYSICAL THERAPY | Age: 22
End: 2022-11-17
Payer: OTHER GOVERNMENT

## 2022-12-13 NOTE — PROGRESS NOTES
[facility] PHYSICAL THERAPY  [address], [place], [address] - Phone: [phone]  Fax: [phone]  DISCHARGE SUMMARY  Patient Name: No Tiwari : 2000   Treatment/Medical Diagnosis: Other sprain of left hip, initial encounter [S73.192A]   Referral Source: Olena Bailon MD     Date of Initial Visit: 2020 Attended Visits: 23 Missed Visits: 9     SUMMARY OF TREATMENT  Pt attended 27 sessions of PT s/p L hip acetabular labral repair w/ femoral osteochondroplasty (DOS 2020). PT tx has consisted of gradual return to sport progression per protocol, including therapeutic exercise, therapeutic activity, neuromuscular reeducation, manual therapy and modalities prn, and HEP instruction. CURRENT STATUS  Pt progressed steadily with L hip rehab, however high level return to sport/lifting activity limited 2' onset of progressively worsening R hip and knee pain. Unfortunately, pt c/o severe R knee instability; known prior ACL injury on the R. Pt contacted clinic on 2020, requesting DC from PT as she was undergoing R knee MRI and likely surgical reconstruction of the R ACL. Last obtained objective measurements as outlined on PN dated 10/22/2020:    STRENGTH:  MMT Left hip grossly 5/5 except adduction 4+/5; R hip grossly 4+/5 throughout   Leg Symmetry Index= 71.5% for RLE (R leg is 71.5% symmetrical to LLE)  PALPATION:  Pt with ttp and hypertonicity noted to b/l GM, TFL, ITB.    GOALS:  1. Pt will be able to jog on TM for 10-15 min continuously at 5.5 MPH or greater with good form to achieve personal goal to return to premorbid running activity. -10/28: goal not met; unable to tolerate plyometric activities to R hip at this time due to pain  2. Patient will report at least 50% functional improvement with sleeping tolerance related to R hip pain. -: goal not met; no change  3. 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 11/11/20: 3/10 max pain R hip in last week; goal in progress  4. Pt will report >/=50% improvement in standing/walking tolerance with work duties related to R hip pain. -11/4: goal progressing; reports 25% improvement. Discharge due to inability to progress L hip PT due to presence of other comorbidities. If you have any questions/comments please contact us directly at [phone]. Thank you for allowing us to assist in the care of your patient.     Therapist Signature: Richi Wu, PT Date: 2/19/2021 10:37 AM

## 2023-09-26 ENCOUNTER — HOSPITAL ENCOUNTER (OUTPATIENT)
Facility: HOSPITAL | Age: 23
Setting detail: RECURRING SERIES
Discharge: HOME | End: 2023-09-29
Payer: COMMERCIAL

## 2023-09-26 PROCEDURE — 97110 THERAPEUTIC EXERCISES: CPT

## 2023-09-26 PROCEDURE — 97162 PT EVAL MOD COMPLEX 30 MIN: CPT

## 2023-09-26 NOTE — PROGRESS NOTES
PHYSICAL / OCCUPATIONAL THERAPY - DAILY TREATMENT NOTE (updated )    Patient Name: Andre Orlando    Date: 2023    : 2000  Insurance: Payor: GENERIC MCO / Plan: Lana Pinon O WC / Product Type: *No Product type* /      Patient  verified Yes     Visit #   Current / Total 1 18   Time   In / Out 820 855   Pain   In / Out 7/10 7/10   Subjective Functional Status/Changes: See POC     TREATMENT AREA =  Right hip pain [M25.551]    OBJECTIVE         Therapeutic Procedures: Tx Min Billable or 1:1 Min (if diff from Tx Min) Procedure, Rationale, Specifics   10  40555 Therapeutic Exercise (timed):  increase ROM, strength, coordination, balance, and proprioception to improve patient's ability to progress to PLOF and address remaining functional goals. (see flow sheet as applicable)     Details if applicable:              Details if applicable:            Details if applicable:            Details if applicable:            Details if applicable:     10   BC Totals Reminder: bill using total billable min of TIMED therapeutic procedures (example: do not include dry needle or estim unattended, both untimed codes, in totals to left)  8-22 min = 1 unit; 23-37 min = 2 units; 38-52 min = 3 units; 53-67 min = 4 units; 68-82 min = 5 units   Total Total     [x]  Patient Education billed concurrently with other procedures   [x] Review HEP    [] Progressed/Changed HEP, detail:    [] Other detail:       Objective Information/Functional Measures/Assessment    See POC    Patient will continue to benefit from skilled PT / OT services to modify and progress therapeutic interventions, analyze and address functional mobility deficits, analyze and address ROM deficits, analyze and address strength deficits, analyze and address soft tissue restrictions, analyze and cue for proper movement patterns, and analyze and modify for postural abnormalities to address functional deficits and attain remaining goals.     Progress toward

## 2023-09-26 NOTE — THERAPY EVALUATION
[facility] PHYSICAL THERAPY  [address] [address], [place]  Phone: [phone]   [phone]  Plan of Care / Statement of Necessity for Physical Therapy Services     Patient Name: Jessica Hayes : 2000   Medical   Diagnosis: Right hip pain [M25.551] Treatment Diagnosis:  M25.551  RIGHT HIP PAIN     Onset Date: 23     Referral Source: EVETTE Weldon CNP Start of Care [facility]): 2023   Prior Hospitalization: See medical history Provider #: [id number]   Prior Level of Function: No difficulty with prolonged standing, walking, sitting, work our household activities. Comorbidities: Hx of B hip labral repairs (R , L ); Hx of R knee scope x2     Assessment / key information: Pt is a 21 y/o female who presents to PT w/ c/o R hip pain. Patient states she was at work on 23 when she slipped with her R LE. 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 Patient reports she didn't experience immediate pain, but felt some about 30-60 minutes later. A few days later patient received x-rays which were unremarkable and was placed on anti-inflammatories. Patient feels her symptoms have been worsening since her originally injury date with constant sharp pains to the front of her R hip and groin. Pt notes pain ranges 5/10 to 8/10, made worse with prolonged walking, standing, sitting, household or work activities; better with medication. Describes pain as sharp, located R anterior hip and groin. Testing/imaging has included x-rays. Prior treatment has included none since most recent injury. Red flags none.  FOTO 40/100     Clinical Exam Findings:  POSTURE/OBSERVATION: B genu valgum in standing  FUNCTIONAL ASSESSMENT: gait ambulates with reduced hip extension on the R (pain associated with hip ext), reduced stance time on

## 2023-09-27 ENCOUNTER — HOSPITAL ENCOUNTER (OUTPATIENT)
Facility: HOSPITAL | Age: 23
Setting detail: RECURRING SERIES
Discharge: HOME | End: 2023-09-30
Payer: COMMERCIAL

## 2023-09-27 PROCEDURE — 97112 NEUROMUSCULAR REEDUCATION: CPT

## 2023-09-27 PROCEDURE — 97110 THERAPEUTIC EXERCISES: CPT

## 2023-09-27 PROCEDURE — 97140 MANUAL THERAPY 1/> REGIONS: CPT

## 2023-09-27 NOTE — PROGRESS NOTES
PHYSICAL / OCCUPATIONAL THERAPY - DAILY TREATMENT NOTE (updated )    Patient Name: Brendon Simmons    Date: 2023    : 2000  Insurance: Payor: GENERIC MCO / Plan: Lane [employer]re[employer] MCO WC / Product Type: *No Product type* /      Patient  verified Yes     Visit #   Current / Total 2 18   Time   In / Out 3:00 348   Pain   In / Out 6/10 7/10   Subjective Functional Status/Changes: Pt reports her right hip feels very similar to when she tore her labrum. Currently light duty, sitting more than normal. Has to get up and change positions 2' pain with prolonged sitting (>5-10 minutes). TREATMENT AREA =  Right hip pain [M25.551]    OBJECTIVE    Modalities Rationale:     decrease inflammation and decrease pain to improve patient's ability to progress to PLOF and address remaining functional goals. min [] Estim Unattended, type/location:                                      []  w/ice    []  w/heat    min [] Estim Attended, type/location:                                     []  w/US     []  w/ice    []  w/heat    []  TENS insruct      min []  Mechanical Traction: type/lbs                   []  pro   []  sup   []  int   []  cont    []  before manual    []  after manual    min []  Ultrasound, settings/location:     10 min  unbill [x]  Ice     []  Heat    location/position:     min []  Paraffin,  details:     min []  Vasopneumatic Device, press/temp:     min []  [place] / Thesrinatha Blowers: If using vaso (only need to measure limb vaso being performed on)      pre-treatment girth :       post-treatment girth :       measured at (landmark location) :      min []  Other:    Skin assessment post-treatment:   Intact      Therapeutic Procedures: Tx Min Billable or 1:1 Min (if diff from Tx Min) Procedure, Rationale, Specifics   20  47260 Therapeutic Exercise (timed):  increase ROM, strength, coordination, balance, and proprioception to improve patient's ability to progress to PLOF and address remaining functional goals.

## 2023-09-29 ENCOUNTER — HOSPITAL ENCOUNTER (OUTPATIENT)
Facility: HOSPITAL | Age: 23
Setting detail: RECURRING SERIES
End: 2023-09-29
Payer: COMMERCIAL

## 2023-09-29 PROCEDURE — 97140 MANUAL THERAPY 1/> REGIONS: CPT

## 2023-09-29 PROCEDURE — 97110 THERAPEUTIC EXERCISES: CPT

## 2023-09-29 PROCEDURE — 97112 NEUROMUSCULAR REEDUCATION: CPT

## 2023-09-29 NOTE — PROGRESS NOTES
PHYSICAL / OCCUPATIONAL THERAPY - DAILY TREATMENT NOTE (updated )    Patient Name: Noemi Bassett    Date: 2023    : 2000  Insurance: Payor: GENERIC MCO / Plan: Thi DIGGS WC / Product Type: *No Product type* /      Patient  verified Yes     Visit #   Current / Total 3 18   Time   In / Out 12:20 1:15   Pain   In / Out 5/10 5/10   Subjective Functional Status/Changes: My hip was doing pretty good up until I drove here because I just woke up before I came here, but sitting especially in the car and flexing my hip which is usually what aggravates it. TREATMENT AREA =  Right hip pain [M25.551]    OBJECTIVE    Modalities Rationale:     decrease inflammation and decrease pain to improve patient's ability to progress to PLOF and address remaining functional goals. min [] Estim Unattended, type/location:                                      []  w/ice    []  w/heat    min [] Estim Attended, type/location:                                     []  w/US     []  w/ice    []  w/heat    []  TENS insruct      min []  Mechanical Traction: type/lbs                   []  pro   []  sup   []  int   []  cont    []  before manual    []  after manual    min []  Ultrasound, settings/location:     10 min  unbill [x]  Ice     []  Heat    location/position: R anterior hip/supine     min []  Paraffin,  details:     min []  Vasopneumatic Device, press/temp:     min []  Patricia Nab / [place] Blind: If using vaso (only need to measure limb vaso being performed on)      pre-treatment girth :       post-treatment girth :       measured at (landmark location) :      min []  Other:    Skin assessment post-treatment:   Redness, no adverse reactions      Therapeutic Procedures:     Tx Min Billable or 1:1 Min (if diff from Tx Min) Procedure, Rationale, Specifics   [id number] Therapeutic Exercise (timed):  increase ROM, strength, coordination, balance, and proprioception to improve patient's ability to progress to PLOF and address remaining functional goals. (see flow sheet as applicable)     Details if applicable:       10  35786 Neuromuscular Re-Education (timed):  improve balance, coordination, kinesthetic sense, posture, core stability and proprioception to improve patient's ability to develop conscious control of individual muscles and awareness of position of extremities in order to progress to PLOF and address remaining functional goals. (see flow sheet as applicable)     Details if applicable:     10  59708 Manual Therapy (timed):  decrease pain, increase ROM, increase tissue extensibility, decrease trigger points, and increase postural awareness to improve patient's ability to progress to PLOF and address remaining functional goals. The manual therapy interventions were performed at a separate and distinct time from the therapeutic activities interventions . (see flow sheet as applicable)     Details if applicable:  grade 2-3 long axis R LE/hip distraction, deep hip flexor releases/CFM and side lying hip flexor/quad combo stretching           Details if applicable:            Details if applicable:     39  [facility] Totals Reminder: bill using total billable min of TIMED therapeutic procedures (example: do not include dry needle or estim unattended, both untimed codes, in totals to left)  8-22 min = 1 unit; 23-37 min = 2 units; 38-52 min = 3 units; 53-67 min = 4 units; 68-82 min = 5 units   Total Total     [x]  Patient Education billed concurrently with other procedures   [] Review HEP    [] Progressed/Changed HEP, detail:    [] Other detail:       Objective Information/Functional Measures/Assessment  Pt presented with chief c/o increased anteromedial hip and pelvic pain/symptoms with active hip flexion as well as prolonged sitting especially on low surfaces as well as with driving activity.  Pt presented with increased palpable tension in hip flexors with deep releases as well as with passive hip flexor stretching with manual

## 2023-10-02 ENCOUNTER — HOSPITAL ENCOUNTER (OUTPATIENT)
Facility: HOSPITAL | Age: 23
Setting detail: RECURRING SERIES
Discharge: HOME | End: 2023-10-05
Payer: COMMERCIAL

## 2023-10-02 PROCEDURE — 97110 THERAPEUTIC EXERCISES: CPT

## 2023-10-02 PROCEDURE — 97112 NEUROMUSCULAR REEDUCATION: CPT

## 2023-10-02 PROCEDURE — 97140 MANUAL THERAPY 1/> REGIONS: CPT

## 2023-10-02 NOTE — PROGRESS NOTES
to progress as tolerated to increase functional strength and mobility for ease of ADL's and improved work activity tolerance. Patient will continue to benefit from skilled PT / OT services to modify and progress therapeutic interventions, analyze and address functional mobility deficits, analyze and address ROM deficits, analyze and address strength deficits, analyze and address soft tissue restrictions, and analyze and cue for proper movement patterns to address functional deficits and attain remaining goals. Progress toward goals / Updated goals:  []  See Progress Note/Recertification    Short Term Goals: To be accomplished in 3 weeks  Patient will demonstrate independence with HEP for self management of symptoms. 9/29/23: Met, pt reports independence and compliance with current HEP 1-2x/day on average   Patient will report reduction in pain at worst to 4-5/10 in order to improve sleeping habits. 10/2/23: 8/10 pain at worst in the past week  Patient will improve R hip flexion AROM to equal the L in order to improve tolerance to seated activities. Long Term Goals: To be accomplished in 6 weeks  Patient will improve FOTO to >/= 66/100 in order to improve quality of life. Patient will report reduction in pain at worst to 1-2/10 in order to improve tolerance to work activities. Patient will improve R hip strength to 5/5 for all planes in order to improve tolerance to recreational activities.     Next PN/ RC due 10/26/2023  Auth due  6 visits (10/13)    PLAN  - Continue Plan of Care  - Upgrade activities as tolerated    Diana Regan PTA    10/2/2023    11:19 AM    Future Appointments   Date Time Provider [address]   10/2/2023 11:40 AM Diana Regan PTA [facility]   10/3/2023 12:20 PM Raul Murphy PT [facility]   10/5/2023 11:00 AM Frances Lopez PTA [facility]

## 2023-10-03 ENCOUNTER — HOSPITAL ENCOUNTER (OUTPATIENT)
Facility: HOSPITAL | Age: 23
Setting detail: RECURRING SERIES
Discharge: HOME | End: 2023-10-06
Payer: COMMERCIAL

## 2023-10-03 PROCEDURE — 97110 THERAPEUTIC EXERCISES: CPT

## 2023-10-03 PROCEDURE — 97112 NEUROMUSCULAR REEDUCATION: CPT

## 2023-10-03 PROCEDURE — 97140 MANUAL THERAPY 1/> REGIONS: CPT

## 2023-10-03 NOTE — PROGRESS NOTES
PHYSICAL / OCCUPATIONAL THERAPY - DAILY TREATMENT NOTE (updated )    Patient Name: Andre Orlando    Date: 10/3/2023    : 2000  Insurance: Payor: GENERIC MCO / Plan: Lana DIGGS WC / Product Type: *No Product type* /      Patient  verified Yes     Visit #   Current / Total 5 18   Time   In / Out 1220 111   Pain   In / Out  4-5/10   Subjective Functional Status/Changes: Patient states her muscles feel a little bit looser, however she continues to experience pain into her hip. Patient states her hip gets aggravated with driving as well as prolonged walking or standing. TREATMENT AREA =  Right hip pain [M25.551]    OBJECTIVE    Modalities Rationale:     decrease inflammation and decrease pain to improve patient's ability to progress to PLOF and address remaining functional goals. min [] Estim Unattended, type/location:                                      []  w/ice    []  w/heat    min [] Estim Attended, type/location:                                     []  w/US     []  w/ice    []  w/heat    []  TENS insruct      min []  Mechanical Traction: type/lbs                   []  pro   []  sup   []  int   []  cont    []  before manual    []  after manual    min []  Ultrasound, settings/location:     10 min  unbill [x]  Ice     []  Heat    location/position: To R hip in supine with bolster    min []  Paraffin,  details:     min []  Vasopneumatic Device, press/temp:     min []  Arnoldo Palomo / [place]: If using vaso (only need to measure limb vaso being performed on)      pre-treatment girth :       post-treatment girth :       measured at (landmark location) :      min []  Other:    Skin assessment post-treatment:   Intact      Therapeutic Procedures:     Tx Min Billable or 1:1 Min (if diff from Tx Min) Procedure, Rationale, Specifics   20  [id number] Therapeutic Exercise (timed):  increase ROM, strength, coordination, balance, and proprioception to improve patient's ability to progress to PLOF and address

## 2023-10-05 ENCOUNTER — HOSPITAL ENCOUNTER (OUTPATIENT)
Facility: HOSPITAL | Age: 23
Setting detail: RECURRING SERIES
Discharge: HOME | End: 2023-10-08
Payer: COMMERCIAL

## 2023-10-05 PROCEDURE — 97140 MANUAL THERAPY 1/> REGIONS: CPT

## 2023-10-05 PROCEDURE — 97110 THERAPEUTIC EXERCISES: CPT

## 2023-10-05 PROCEDURE — 97530 THERAPEUTIC ACTIVITIES: CPT

## 2023-10-05 NOTE — PROGRESS NOTES
[facility]  [address] [address] [facility]  Phone: [phone]   [phone]  PHYSICAL THERAPY PROGRESS NOTE  Patient Name: Herman Landis : 2000   Treatment/Medical Diagnosis: Right hip pain [M25.551]   Referral Source: EVETTE Fish CNP     Date of Initial Visit: 23 Attended Visits: 6 Missed Visits: 0     SUMMARY OF TREATMENT  Therapeutic exercise for R LE/hip focused mobility and strengthening, LE biomechanical symmetry,proprioceptive/balance awareness,  manual intervention, cryotherapy, patient education and HEP. CURRENT STATUS  % improvement: 40%  Max pain 8/10  Avg pain 5/10    Pt presented with increased noticeably increased antalgic gait and increased pain intensity today from prolonged standing/walking at work (consecutive 12 hour work shifts over the past 2 days). Pt presents with signs/sx consistent with labral pathology; would likely benefit from MRA for further diagnosis. Current improvements: increased mobility and strength in involved hip  Remaining functional limitations: prolonged standing and walking > 20 minutes, job duties as a nurse, prolonged sitting > 10 minutes especially with driving activity. Objective measures:  R Hip strength measurements/MMT= Flexion= 4+/5; Abduction= 4-/5 p!; Extension= 4-/5 p!; ER= 4-/5 p! Hip Flexion AROM= R= 92 deg; L= 109 deg (measured in supine)      (+) relief of pain with femoroacetabular distraction, however sharp, rebound pain upon release      FOTO 49/100    SHORT TERM GOALS:  Patient will demonstrate independence with HEP for self management of symptoms  Status at last Eval: established and issued at initial evaluation  Current Status: Pt reports independence and compliance with current HEP  Goal Met?  yes    2. Patient will report reduction in pain at worst to 4-5/10 in order to improve sleeping habits.   Status at last Eval: 8/10  Current Status:

## 2023-10-05 NOTE — PROGRESS NOTES
PHYSICAL / OCCUPATIONAL THERAPY - DAILY TREATMENT NOTE (updated )    Patient Name: Patrizia Aldana    Date: 10/5/2023    : 2000  Insurance: Payor: GENERIC MCO / Plan: Laura DIGGS WC / Product Type: *No Product type* /      Patient  verified Yes     Visit #   Current / Total 6 18   Time   In / Out 11:02 12:07   Pain   In / Out 6/10 6/10   Subjective Functional Status/Changes: My hip is not feeling that great today, I had to work 2 long shifts back to back days yesterday and Tuesday and I am paying for it today. However, the doctor did order an MRI yesterday. TREATMENT AREA =  Right hip pain [M25.551]    OBJECTIVE    Modalities Rationale:     decrease inflammation and decrease pain to improve patient's ability to progress to PLOF and address remaining functional goals. min [] Estim Unattended, type/location:                                      []  w/ice    []  w/heat    min [] Estim Attended, type/location:                                     []  w/US     []  w/ice    []  w/heat    []  TENS insruct      min []  Mechanical Traction: type/lbs                   []  pro   []  sup   []  int   []  cont    []  before manual    []  after manual    min []  Ultrasound, settings/location:     10 min  unbill [x]  Ice     []  Heat    location/position: R anterior hip/supine    min []  Paraffin,  details:     min []  Vasopneumatic Device, press/temp:     min []  Velvet Cross / [place]: If using vaso (only need to measure limb vaso being performed on)      pre-treatment girth :       post-treatment girth :       measured at (landmark location) :      min []  Other:    Skin assessment post-treatment:   Redness, no adverse reactions      Therapeutic Procedures:     Tx Min Billable or 1:1 Min (if diff from Tx Min) Procedure, Rationale, Specifics   28  [id number] Therapeutic Exercise (timed):  increase ROM, strength, coordination, balance, and proprioception to improve patient's ability to progress to PLOF and address